# Patient Record
Sex: MALE | Race: WHITE | Employment: FULL TIME | URBAN - METROPOLITAN AREA
[De-identification: names, ages, dates, MRNs, and addresses within clinical notes are randomized per-mention and may not be internally consistent; named-entity substitution may affect disease eponyms.]

---

## 2019-04-01 ENCOUNTER — TELEPHONE (OUTPATIENT)
Dept: FAMILY MEDICINE | Facility: CLINIC | Age: 44
End: 2019-04-01

## 2019-04-01 ENCOUNTER — OFFICE VISIT (OUTPATIENT)
Dept: FAMILY MEDICINE | Facility: CLINIC | Age: 44
End: 2019-04-01
Payer: COMMERCIAL

## 2019-04-01 VITALS
OXYGEN SATURATION: 99 % | SYSTOLIC BLOOD PRESSURE: 124 MMHG | BODY MASS INDEX: 26.05 KG/M2 | HEART RATE: 86 BPM | WEIGHT: 192.3 LBS | DIASTOLIC BLOOD PRESSURE: 88 MMHG | RESPIRATION RATE: 16 BRPM | HEIGHT: 72 IN | TEMPERATURE: 97.4 F

## 2019-04-01 DIAGNOSIS — R07.89 ATYPICAL CHEST PAIN: Primary | ICD-10-CM

## 2019-04-01 DIAGNOSIS — Z72.0 TOBACCO ABUSE DISORDER: ICD-10-CM

## 2019-04-01 DIAGNOSIS — K21.00 GASTROESOPHAGEAL REFLUX DISEASE WITH ESOPHAGITIS: ICD-10-CM

## 2019-04-01 LAB
ALBUMIN SERPL-MCNC: 4.3 G/DL (ref 3.4–5)
ALP SERPL-CCNC: 70 U/L (ref 40–150)
ALT SERPL W P-5'-P-CCNC: 49 U/L (ref 0–70)
ANION GAP SERPL CALCULATED.3IONS-SCNC: 8 MMOL/L (ref 3–14)
AST SERPL W P-5'-P-CCNC: 16 U/L (ref 0–45)
BILIRUB SERPL-MCNC: 0.4 MG/DL (ref 0.2–1.3)
BUN SERPL-MCNC: 18 MG/DL (ref 7–30)
CALCIUM SERPL-MCNC: 9.1 MG/DL (ref 8.5–10.1)
CHLORIDE SERPL-SCNC: 105 MMOL/L (ref 94–109)
CHOLEST SERPL-MCNC: 257 MG/DL
CO2 SERPL-SCNC: 27 MMOL/L (ref 20–32)
CREAT SERPL-MCNC: 1.1 MG/DL (ref 0.66–1.25)
ERYTHROCYTE [DISTWIDTH] IN BLOOD BY AUTOMATED COUNT: 12.5 % (ref 10–15)
GFR SERPL CREATININE-BSD FRML MDRD: 81 ML/MIN/{1.73_M2}
GLUCOSE SERPL-MCNC: 90 MG/DL (ref 70–99)
HCT VFR BLD AUTO: 46.2 % (ref 40–53)
HDLC SERPL-MCNC: 44 MG/DL
HGB BLD-MCNC: 15.2 G/DL (ref 13.3–17.7)
LDLC SERPL CALC-MCNC: 179 MG/DL
MCH RBC QN AUTO: 30.4 PG (ref 26.5–33)
MCHC RBC AUTO-ENTMCNC: 32.9 G/DL (ref 31.5–36.5)
MCV RBC AUTO: 92 FL (ref 78–100)
NONHDLC SERPL-MCNC: 213 MG/DL
PLATELET # BLD AUTO: 264 10E9/L (ref 150–450)
POTASSIUM SERPL-SCNC: 4.4 MMOL/L (ref 3.4–5.3)
PROT SERPL-MCNC: 8.3 G/DL (ref 6.8–8.8)
RBC # BLD AUTO: 5 10E12/L (ref 4.4–5.9)
SODIUM SERPL-SCNC: 140 MMOL/L (ref 133–144)
TRIGL SERPL-MCNC: 169 MG/DL
TSH SERPL DL<=0.005 MIU/L-ACNC: 2.31 MU/L (ref 0.4–4)
WBC # BLD AUTO: 8.8 10E9/L (ref 4–11)

## 2019-04-01 PROCEDURE — 80053 COMPREHEN METABOLIC PANEL: CPT | Performed by: FAMILY MEDICINE

## 2019-04-01 PROCEDURE — 99214 OFFICE O/P EST MOD 30 MIN: CPT | Performed by: FAMILY MEDICINE

## 2019-04-01 PROCEDURE — 36415 COLL VENOUS BLD VENIPUNCTURE: CPT | Performed by: FAMILY MEDICINE

## 2019-04-01 PROCEDURE — 85027 COMPLETE CBC AUTOMATED: CPT | Performed by: FAMILY MEDICINE

## 2019-04-01 PROCEDURE — 80061 LIPID PANEL: CPT | Performed by: FAMILY MEDICINE

## 2019-04-01 PROCEDURE — 84443 ASSAY THYROID STIM HORMONE: CPT | Performed by: FAMILY MEDICINE

## 2019-04-01 ASSESSMENT — MIFFLIN-ST. JEOR: SCORE: 1794.78

## 2019-04-01 ASSESSMENT — PAIN SCALES - GENERAL: PAINLEVEL: MILD PAIN (2)

## 2019-04-01 NOTE — TELEPHONE ENCOUNTER
Patient has an appt today with Dr Rangel for establish care and Chest pain. Attempted triage call.     RN TRIAGE CALL:    Patient Contact    Attempt # 1    Was call answered?  No.  Left message on voicemail with information to call me back.

## 2019-04-01 NOTE — PROGRESS NOTES
SUBJECTIVE:   Richy Rogers is a 44 year old male who presents to clinic today for the following health issues:    CHEST PAIN     Onset: x 2-3 months    Description:   Location:  left side  Character: tight, sharp on occasion  Radiation: none  Duration: seconds and intermittent     Intensity: mild    Progression of Symptoms:  intermittent    Accompanying Signs & Symptoms:  Shortness of breath: no  Sweating: no  Nausea/vomiting: no  Lightheadedness: no  Palpitations: no  Fever/Chills: no  Cough: no  Heartburn: no    History:   Family history of heart disease YES  Tobacco use: YES    Precipitating factors:   Worse with exertion: no  Worse with deep breaths :  no  Related to food: no    Alleviating factors:  belching       Therapies Tried and outcome: zantac      Pain is been vague in nature and does not seem to be related to activity.  May actually come on more at rest.  Just a dull pain into the anterior left chest and perhaps into his shoulder and upper arm.  He is never short of breath with this but is short of breath with activity.  He has no cough.  There is no fever no chills.  He does have heartburn which is a different discomfort and takes occasional ranitidine.  Smoking is 3 or 4 cigarettes/day.  He did quit at one time.  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    Problem list and histories reviewed & adjusted, as indicated.  Additional history: as documented    BP Readings from Last 3 Encounters:   04/01/19 124/88    Wt Readings from Last 3 Encounters:   04/01/19 87.2 kg (192 lb 4.8 oz)                  Labs reviewed in EPIC    Reviewed and updated as needed this visit by clinical staff       Reviewed and updated as needed this visit by Provider         ROS:  Constitutional, HEENT, cardiovascular, pulmonary, gi and gu systems are negative, except as otherwise noted.    OBJECTIVE:     /88   Pulse 86   Temp 97.4  F (36.3  C) (Temporal)   Resp 16    "Ht 1.82 m (5' 11.65\")   Wt 87.2 kg (192 lb 4.8 oz)   SpO2 99%   BMI 26.33 kg/m    Body mass index is 26.33 kg/m .  GENERAL: healthy, alert and no distress  EYES: Eyes grossly normal to inspection, PERRL and conjunctivae and sclerae normal  HENT: Moist mucous membranes  NECK: no adenopathy, no asymmetry, masses, or scars and thyroid normal to palpation  RESP: lungs clear to auscultation - no rales, rhonchi or wheezes  CV: regular rate and rhythm, normal S1 S2, no S3 or S4, no murmur, click or rub, no peripheral edema and peripheral pulses strong  ABDOMEN: soft, nontender, no hepatosplenomegaly, no masses and bowel sounds normal  MS: no gross musculoskeletal defects noted, no edema  SKIN: no suspicious lesions or rashes  NEURO: Negative  Diagnostic Test Results:  Results for orders placed or performed in visit on 04/01/19 (from the past 24 hour(s))   Lipid Profile   Result Value Ref Range    Cholesterol 257 (H) <200 mg/dL    Triglycerides 169 (H) <150 mg/dL    HDL Cholesterol 44 >39 mg/dL    LDL Cholesterol Calculated 179 (H) <100 mg/dL    Non HDL Cholesterol 213 (H) <130 mg/dL   Comprehensive metabolic panel   Result Value Ref Range    Sodium 140 133 - 144 mmol/L    Potassium 4.4 3.4 - 5.3 mmol/L    Chloride 105 94 - 109 mmol/L    Carbon Dioxide 27 20 - 32 mmol/L    Anion Gap 8 3 - 14 mmol/L    Glucose 90 70 - 99 mg/dL    Urea Nitrogen 18 7 - 30 mg/dL    Creatinine 1.10 0.66 - 1.25 mg/dL    GFR Estimate 81 >60 mL/min/[1.73_m2]    GFR Estimate If Black >90 >60 mL/min/[1.73_m2]    Calcium 9.1 8.5 - 10.1 mg/dL    Bilirubin Total 0.4 0.2 - 1.3 mg/dL    Albumin 4.3 3.4 - 5.0 g/dL    Protein Total 8.3 6.8 - 8.8 g/dL    Alkaline Phosphatase 70 40 - 150 U/L    ALT 49 0 - 70 U/L    AST 16 0 - 45 U/L   TSH   Result Value Ref Range    TSH 2.31 0.40 - 4.00 mU/L   CBC with platelets   Result Value Ref Range    WBC 8.8 4.0 - 11.0 10e9/L    RBC Count 5.00 4.4 - 5.9 10e12/L    Hemoglobin 15.2 13.3 - 17.7 g/dL    Hematocrit 46.2 " "40.0 - 53.0 %    MCV 92 78 - 100 fl    MCH 30.4 26.5 - 33.0 pg    MCHC 32.9 31.5 - 36.5 g/dL    RDW 12.5 10.0 - 15.0 %    Platelet Count 264 150 - 450 10e9/L     PSYCH: mentation appears normal, affect normal/bright        ASSESSMENT/PLAN:         BP Screening:   Last 3 BP Readings:    BP Readings from Last 3 Encounters:   04/01/19 124/88       The following was recommended to the patient:  Re-screen BP within a year and recommended lifestyle modifications  Tobacco Cessation:   reports that he has been smoking cigarettes.  He has been smoking about 0.25 packs per day. He uses smokeless tobacco.  Tobacco Cessation Action Plan: Information offered: Patient not interested at this time    BMI:   Estimated body mass index is 26.33 kg/m  as calculated from the following:    Height as of this encounter: 1.82 m (5' 11.65\").    Weight as of this encounter: 87.2 kg (192 lb 4.8 oz).   Weight management plan: Not really discussed at this time      1. Atypical chest pain  Certainly given family history, cholesterol, smoking etc. his coronary artery risk relatively high.  Stress testing is planned below.  We discussed what things should send him to the emergency room or at any time if he has questions  - Lipid Profile  - Comprehensive metabolic panel  - TSH  - CBC with platelets  - NM Exercise stress test; Future    2. Tobacco abuse disorder  He actually does believe he could quit smoking but did not request any specific help.  He is aware of the risks    3. Gastroesophageal reflux disease with esophagitis  For now continue ranitidine.  Uncertain if this accounts for the chest pain with something such as a hiatal hernia.  During her workup at some point we may need to do more testing.      Patient Instructions   Have the stress tests and come back after to discuss.  If any time you think chest pain is worse or more symptoms, go to emergency      Richy Maldonado Rangel MD  [facility]  "

## 2019-04-01 NOTE — PATIENT INSTRUCTIONS
Have the stress tests and come back after to discuss.  If any time you think chest pain is worse or more symptoms, go to emergency

## 2019-04-02 NOTE — RESULT ENCOUNTER NOTE
Will have staff inform patient his tests/labs are normal or the values which are reported out of range are expected or in patient's usual range except Cholesterol and  triglyceride numbers. We will address this after his stress test. Treatment will be  the same for now

## 2019-04-04 ENCOUNTER — HOSPITAL ENCOUNTER (OUTPATIENT)
Dept: NUCLEAR MEDICINE | Facility: CLINIC | Age: 44
Setting detail: NUCLEAR MEDICINE
Discharge: HOME | End: 2019-04-04
Attending: FAMILY MEDICINE | Admitting: FAMILY MEDICINE
Payer: COMMERCIAL

## 2019-04-04 DIAGNOSIS — R07.89 ATYPICAL CHEST PAIN: ICD-10-CM

## 2019-04-04 PROCEDURE — A9502 TC99M TETROFOSMIN: HCPCS | Performed by: FAMILY MEDICINE

## 2019-04-04 PROCEDURE — 78452 HT MUSCLE IMAGE SPECT MULT: CPT

## 2019-04-04 PROCEDURE — 93017 CV STRESS TEST TRACING ONLY: CPT | Performed by: INTERNAL MEDICINE

## 2019-04-04 PROCEDURE — 78452 HT MUSCLE IMAGE SPECT MULT: CPT | Mod: 26 | Performed by: INTERNAL MEDICINE

## 2019-04-04 PROCEDURE — 34300033 ZZH RX 343: Performed by: FAMILY MEDICINE

## 2019-04-04 PROCEDURE — 93018 CV STRESS TEST I&R ONLY: CPT | Performed by: INTERNAL MEDICINE

## 2019-04-04 PROCEDURE — 93016 CV STRESS TEST SUPVJ ONLY: CPT | Performed by: INTERNAL MEDICINE

## 2019-04-04 RX ADMIN — TETROFOSMIN 30.7 MCI.: 1.38 INJECTION, POWDER, LYOPHILIZED, FOR SOLUTION INTRAVENOUS at 10:04

## 2019-04-04 RX ADMIN — TETROFOSMIN 10.3 MCI.: 1.38 INJECTION, POWDER, LYOPHILIZED, FOR SOLUTION INTRAVENOUS at 07:50

## 2019-04-05 ENCOUNTER — TELEPHONE (OUTPATIENT)
Dept: FAMILY MEDICINE | Facility: CLINIC | Age: 44
End: 2019-04-05

## 2019-04-05 DIAGNOSIS — R07.89 ATYPICAL CHEST PAIN: Primary | ICD-10-CM

## 2019-04-05 NOTE — TELEPHONE ENCOUNTER
----- Message from Richy Rangel MD sent at 4/5/2019 12:15 PM CDT -----  Team D, please help arrange a gastroscopy for his atypical chest pain.  Then also make sure he has a follow-up with me in April

## 2019-04-05 NOTE — TELEPHONE ENCOUNTER
Order placed for GI procedure referral for EGD. They will contact patient to schedule. Called patient to advise and states understanding. He scheduled follow up visit with Dr. Rangel on 4/15  Lynda Pascal CMA

## 2019-04-08 ENCOUNTER — TELEPHONE (OUTPATIENT)
Dept: FAMILY MEDICINE | Facility: CLINIC | Age: 44
End: 2019-04-08

## 2019-04-08 NOTE — TELEPHONE ENCOUNTER
Date of colonoscopy/EGD: 4/18  Surgeon: Dr. Saleh  Prep:egd  Packet:Colonoscopy/EGD instructions mailed to patient's home address.   Date: 4/8/2019      Surgery Scheduler

## 2019-04-12 ENCOUNTER — OFFICE VISIT (OUTPATIENT)
Dept: FAMILY MEDICINE | Facility: CLINIC | Age: 44
End: 2019-04-12
Payer: COMMERCIAL

## 2019-04-12 VITALS
TEMPERATURE: 96.7 F | WEIGHT: 193.8 LBS | HEART RATE: 86 BPM | BODY MASS INDEX: 26.54 KG/M2 | OXYGEN SATURATION: 97 % | DIASTOLIC BLOOD PRESSURE: 72 MMHG | SYSTOLIC BLOOD PRESSURE: 116 MMHG | RESPIRATION RATE: 16 BRPM

## 2019-04-12 DIAGNOSIS — Z72.0 TOBACCO ABUSE DISORDER: ICD-10-CM

## 2019-04-12 DIAGNOSIS — R07.89 ATYPICAL CHEST PAIN: Primary | ICD-10-CM

## 2019-04-12 PROCEDURE — 99214 OFFICE O/P EST MOD 30 MIN: CPT | Performed by: FAMILY MEDICINE

## 2019-04-12 ASSESSMENT — PAIN SCALES - GENERAL: PAINLEVEL: NO PAIN (0)

## 2019-04-12 NOTE — H&P (VIEW-ONLY)
SUBJECTIVE:   Richy Rogers is a 44 year old male who presents to clinic today for the following   health issues:      Chief Complaint   Patient presents with     RECHECK     chest pressure; still having       Patient is here today to discuss his stress test and chest pain.  There still is some upper left sided chest pain.  Remains that it can happen at any time not just with activity.  He is having trouble swallowing and feels at times as if food is sticking.  He did start his reflux medication.  He will have gastroscopy in the next week or so.  He otherwise denies shortness of breath.  Bowel habits are okay.  Week he is feeling reasonably well.  He continues to smoke.    Additional history: as documented    Reviewed  and updated as needed this visit by clinical staff  Tobacco  Allergies  Meds  Med Hx  Surg Hx  Fam Hx  Soc Hx        Reviewed and updated as needed this visit by Provider         BP Readings from Last 3 Encounters:   04/12/19 116/72   04/01/19 124/88    Wt Readings from Last 3 Encounters:   04/12/19 87.9 kg (193 lb 12.8 oz)   04/01/19 87.2 kg (192 lb 4.8 oz)                  Labs reviewed in EPIC    ROS:  Constitutional, HEENT, cardiovascular, pulmonary, gi and gu systems are negative, except as otherwise noted.    OBJECTIVE:     /72   Pulse 86   Temp 96.7  F (35.9  C) (Temporal)   Resp 16   Wt 87.9 kg (193 lb 12.8 oz)   SpO2 97%   BMI 26.54 kg/m    Body mass index is 26.54 kg/m .  GENERAL: healthy, alert and no distress  EYES: Eyes grossly normal to inspection, PERRL and conjunctivae and sclerae normal  NECK: no adenopathy, no asymmetry, masses, or scars and thyroid normal to palpation  RESP: lungs clear to auscultation - no rales, rhonchi or wheezes  CV: regular rate and rhythm, normal S1 S2, no S3 or S4, no murmur, click or rub, no peripheral edema and peripheral pulses strong  ABDOMEN: soft, nontender, no hepatosplenomegaly, no masses and bowel sounds normal  MS: no gross  "musculoskeletal defects noted, no edema    Diagnostic Test Results:  none     ASSESSMENT/PLAN:           Tobacco Cessation:   reports that he has been smoking cigarettes.  He has been smoking about 0.25 packs per day. He uses smokeless tobacco.  Tobacco Cessation Action Plan: Pharmacotherapies : Nicotine patch  Discussed other non-smoking behavior.  And forced that attempt to quit smoking her practice and not failure.  Also gave him some very specific tips to limit the amount of cigarettes to smoke.  He already has instituted some of these on his own.    BMI:   Estimated body mass index is 26.54 kg/m  as calculated from the following:    Height as of 4/1/19: 1.82 m (5' 11.65\").    Weight as of this encounter: 87.9 kg (193 lb 12.8 oz).   Weight management plan: Discussed healthy diet and exercise guidelines      1. Atypical chest pain  We discussed his nonstress test which does not show any ischemia.  He and his wife had questions about ejection fraction at 56%.  He is reassured this is considered standard and normal.  I suspect his chest pain is coming from reflux especially with his difficulty swallowing.  Gastroscopy will be done and we will proceed from there.    2. Tobacco abuse disorder  As above. There is commitment to make an effort.    We discussed Cholesterol and  triglyceride numbers and at this time,no statin added. Recheck annually. Work to be a nonsmoker and his risks reduce. As this is documented I am reviewing his family history and I may offer statin at next visit.       Patient Instructions   Heart looks ok  We need to check Cholesterol and  triglyceride numbers each year  Work/pdractice to be a non smoker  After gastroscopy we will let you know if Dr Saleh does not fully explain.      Time spent with greater than 50% spent in discussion, making the plan, or filling out paperwork with patient for illness/treatments was 25 minutes or more.  Richy Maldonado Rangel MD  [facility]      "

## 2019-04-12 NOTE — PATIENT INSTRUCTIONS
Heart looks ok  We need to check Cholesterol and  triglyceride numbers each year  Work/pdractice to be a non smoker  After gastroscopy we will let you know if Dr Saleh does not fully explain.

## 2019-04-12 NOTE — PROGRESS NOTES
SUBJECTIVE:   Richy Rogers is a 44 year old male who presents to clinic today for the following   health issues:      Chief Complaint   Patient presents with     RECHECK     chest pressure; still having       Patient is here today to discuss his stress test and chest pain.  There still is some upper left sided chest pain.  Remains that it can happen at any time not just with activity.  He is having trouble swallowing and feels at times as if food is sticking.  He did start his reflux medication.  He will have gastroscopy in the next week or so.  He otherwise denies shortness of breath.  Bowel habits are okay.  Week he is feeling reasonably well.  He continues to smoke.    Additional history: as documented    Reviewed  and updated as needed this visit by clinical staff  Tobacco  Allergies  Meds  Med Hx  Surg Hx  Fam Hx  Soc Hx        Reviewed and updated as needed this visit by Provider         BP Readings from Last 3 Encounters:   04/12/19 116/72   04/01/19 124/88    Wt Readings from Last 3 Encounters:   04/12/19 87.9 kg (193 lb 12.8 oz)   04/01/19 87.2 kg (192 lb 4.8 oz)                  Labs reviewed in EPIC    ROS:  Constitutional, HEENT, cardiovascular, pulmonary, gi and gu systems are negative, except as otherwise noted.    OBJECTIVE:     /72   Pulse 86   Temp 96.7  F (35.9  C) (Temporal)   Resp 16   Wt 87.9 kg (193 lb 12.8 oz)   SpO2 97%   BMI 26.54 kg/m    Body mass index is 26.54 kg/m .  GENERAL: healthy, alert and no distress  EYES: Eyes grossly normal to inspection, PERRL and conjunctivae and sclerae normal  NECK: no adenopathy, no asymmetry, masses, or scars and thyroid normal to palpation  RESP: lungs clear to auscultation - no rales, rhonchi or wheezes  CV: regular rate and rhythm, normal S1 S2, no S3 or S4, no murmur, click or rub, no peripheral edema and peripheral pulses strong  ABDOMEN: soft, nontender, no hepatosplenomegaly, no masses and bowel sounds normal  MS: no gross  "musculoskeletal defects noted, no edema    Diagnostic Test Results:  none     ASSESSMENT/PLAN:           Tobacco Cessation:   reports that he has been smoking cigarettes.  He has been smoking about 0.25 packs per day. He uses smokeless tobacco.  Tobacco Cessation Action Plan: Pharmacotherapies : Nicotine patch  Discussed other non-smoking behavior.  And forced that attempt to quit smoking her practice and not failure.  Also gave him some very specific tips to limit the amount of cigarettes to smoke.  He already has instituted some of these on his own.    BMI:   Estimated body mass index is 26.54 kg/m  as calculated from the following:    Height as of 4/1/19: 1.82 m (5' 11.65\").    Weight as of this encounter: 87.9 kg (193 lb 12.8 oz).   Weight management plan: Discussed healthy diet and exercise guidelines      1. Atypical chest pain  We discussed his nonstress test which does not show any ischemia.  He and his wife had questions about ejection fraction at 56%.  He is reassured this is considered standard and normal.  I suspect his chest pain is coming from reflux especially with his difficulty swallowing.  Gastroscopy will be done and we will proceed from there.    2. Tobacco abuse disorder  As above. There is commitment to make an effort.    We discussed Cholesterol and  triglyceride numbers and at this time,no statin added. Recheck annually. Work to be a nonsmoker and his risks reduce. As this is documented I am reviewing his family history and I may offer statin at next visit.       Patient Instructions   Heart looks ok  We need to check Cholesterol and  triglyceride numbers each year  Work/pdractice to be a non smoker  After gastroscopy we will let you know if Dr Saleh does not fully explain.      Time spent with greater than 50% spent in discussion, making the plan, or filling out paperwork with patient for illness/treatments was 25 minutes or more.  Richy Maldonado Rangel MD  [facility]      "

## 2019-04-17 ENCOUNTER — ANESTHESIA EVENT (OUTPATIENT)
Dept: GASTROENTEROLOGY | Facility: CLINIC | Age: 44
End: 2019-04-17
Payer: COMMERCIAL

## 2019-04-17 ASSESSMENT — LIFESTYLE VARIABLES: TOBACCO_USE: 1

## 2019-04-18 ENCOUNTER — HOSPITAL ENCOUNTER (OUTPATIENT)
Facility: CLINIC | Age: 44
Discharge: HOME | End: 2019-04-18
Attending: SURGERY | Admitting: SURGERY
Payer: COMMERCIAL

## 2019-04-18 ENCOUNTER — ANESTHESIA (OUTPATIENT)
Dept: GASTROENTEROLOGY | Facility: CLINIC | Age: 44
End: 2019-04-18
Payer: COMMERCIAL

## 2019-04-18 VITALS
TEMPERATURE: 97.7 F | BODY MASS INDEX: 25.9 KG/M2 | SYSTOLIC BLOOD PRESSURE: 125 MMHG | RESPIRATION RATE: 16 BRPM | OXYGEN SATURATION: 98 % | HEIGHT: 71 IN | HEART RATE: 88 BPM | DIASTOLIC BLOOD PRESSURE: 85 MMHG | WEIGHT: 185 LBS

## 2019-04-18 LAB — UPPER GI ENDOSCOPY: NORMAL

## 2019-04-18 PROCEDURE — 43239 EGD BIOPSY SINGLE/MULTIPLE: CPT | Performed by: SURGERY

## 2019-04-18 PROCEDURE — 25000125 ZZHC RX 250: Performed by: NURSE ANESTHETIST, CERTIFIED REGISTERED

## 2019-04-18 PROCEDURE — 88305 TISSUE EXAM BY PATHOLOGIST: CPT | Performed by: SURGERY

## 2019-04-18 PROCEDURE — 25000128 H RX IP 250 OP 636: Performed by: NURSE ANESTHETIST, CERTIFIED REGISTERED

## 2019-04-18 PROCEDURE — 37000008 ZZH ANESTHESIA TECHNICAL FEE, 1ST 30 MIN: Performed by: SURGERY

## 2019-04-18 PROCEDURE — 25800030 ZZH RX IP 258 OP 636: Performed by: NURSE ANESTHETIST, CERTIFIED REGISTERED

## 2019-04-18 PROCEDURE — 25000125 ZZHC RX 250: Performed by: SURGERY

## 2019-04-18 RX ORDER — NALOXONE HYDROCHLORIDE 0.4 MG/ML
.1-.4 INJECTION, SOLUTION INTRAMUSCULAR; INTRAVENOUS; SUBCUTANEOUS
Status: DISCONTINUED | OUTPATIENT
Start: 2019-04-18 | End: 2019-04-18 | Stop reason: HOSPADM

## 2019-04-18 RX ORDER — SODIUM CHLORIDE, SODIUM LACTATE, POTASSIUM CHLORIDE, CALCIUM CHLORIDE 600; 310; 30; 20 MG/100ML; MG/100ML; MG/100ML; MG/100ML
INJECTION, SOLUTION INTRAVENOUS CONTINUOUS
Status: DISCONTINUED | OUTPATIENT
Start: 2019-04-18 | End: 2019-04-18 | Stop reason: HOSPADM

## 2019-04-18 RX ORDER — ONDANSETRON 4 MG/1
4 TABLET, ORALLY DISINTEGRATING ORAL EVERY 30 MIN PRN
Status: DISCONTINUED | OUTPATIENT
Start: 2019-04-18 | End: 2019-04-18 | Stop reason: HOSPADM

## 2019-04-18 RX ORDER — ONDANSETRON 2 MG/ML
4 INJECTION INTRAMUSCULAR; INTRAVENOUS EVERY 30 MIN PRN
Status: DISCONTINUED | OUTPATIENT
Start: 2019-04-18 | End: 2019-04-18 | Stop reason: HOSPADM

## 2019-04-18 RX ORDER — LIDOCAINE 40 MG/G
CREAM TOPICAL
Status: DISCONTINUED | OUTPATIENT
Start: 2019-04-18 | End: 2019-04-18 | Stop reason: HOSPADM

## 2019-04-18 RX ORDER — PROPOFOL 10 MG/ML
INJECTION, EMULSION INTRAVENOUS PRN
Status: DISCONTINUED | OUTPATIENT
Start: 2019-04-18 | End: 2019-04-18

## 2019-04-18 RX ORDER — PROPOFOL 10 MG/ML
INJECTION, EMULSION INTRAVENOUS CONTINUOUS PRN
Status: DISCONTINUED | OUTPATIENT
Start: 2019-04-18 | End: 2019-04-18

## 2019-04-18 RX ORDER — MEPERIDINE HYDROCHLORIDE 25 MG/ML
12.5 INJECTION INTRAMUSCULAR; INTRAVENOUS; SUBCUTANEOUS
Status: DISCONTINUED | OUTPATIENT
Start: 2019-04-18 | End: 2019-04-18 | Stop reason: HOSPADM

## 2019-04-18 RX ADMIN — SODIUM CHLORIDE, POTASSIUM CHLORIDE, SODIUM LACTATE AND CALCIUM CHLORIDE: 600; 310; 30; 20 INJECTION, SOLUTION INTRAVENOUS at 09:43

## 2019-04-18 RX ADMIN — LIDOCAINE HYDROCHLORIDE 0.5 ML: 10 INJECTION, SOLUTION EPIDURAL; INFILTRATION; INTRACAUDAL; PERINEURAL at 09:42

## 2019-04-18 RX ADMIN — PROPOFOL 50 MG: 10 INJECTION, EMULSION INTRAVENOUS at 10:29

## 2019-04-18 RX ADMIN — PROPOFOL 150 MCG/KG/MIN: 10 INJECTION, EMULSION INTRAVENOUS at 10:29

## 2019-04-18 ASSESSMENT — MIFFLIN-ST. JEOR: SCORE: 1751.28

## 2019-04-18 NOTE — DISCHARGE INSTRUCTIONS
[facility]    Home Care Following Endoscopy          Activity:    You have just undergone an endoscopic procedure usually performed with conscious sedation.  Do not work or operate machinery (including a car) for at least 12 hours.      I encourage you to walk and attempt to pass this air as soon as possible.    Diet:    Return to the diet you were on before your procedure but eat lightly for the first 12-24 hours.    Drink plenty of water.    Resume any regular medications unless otherwise advised by your physician.  Please begin any new medication prescribed as a result of your procedure as directed by your physician.     If you had any biopsy or polyp removed please refrain from aspirin or aspirin products for 2 days.  If on Coumadin please restart as instructed by your physician.   Pain:    You may take Tylenol as needed for pain.  Expected Recovery:    It is also normal to have a mild sore throat after upper endoscopy.    Call Your Physician if You Have:    After Upper Endoscopy:  o Shoulder, back or chest pain.  o Difficulty breathing or swallowing.  o Vomiting blood.    Any questions or concerns about your recovery, please call [phone] or after hours [phone] Nurse Advice Line.    Follow-up Care:    You should receive a call or letter with your results within 1 week. Please call if you have not received a notification of your results.  If asked to return to clinic please make an appointment 1 week after your procedure.  Call [phone].

## 2019-04-18 NOTE — ANESTHESIA PREPROCEDURE EVALUATION
Anesthesia Pre-Procedure Evaluation    Patient: Scarlet Rogers   MRN: [id number] : 1975          Preoperative Diagnosis: Atypical chest pain    Procedure(s):  ESOPHAGOSCOPY, GASTROSCOPY, DUODENOSCOPY (EGD)    History reviewed. No pertinent past medical history.  History reviewed. No pertinent surgical history.    Anesthesia Evaluation     . Pt has not had prior anesthetic            ROS/MED HX    ENT/Pulmonary:     (+)tobacco use, Current use , . .    Neurologic:  - neg neurologic ROS     Cardiovascular:  - neg cardiovascular ROS   (+) ----. : . . . :. . Previous cardiac testing date:results:Stress Testdate:19 results:GATED MYOCARDIAL PERFUSION SCINTIGRAPHY EXERCISE- ONE DAY STUDY      2019 11:29 AM SCARLET ROGERS 44 years Male 1975.     Indication/Clinical History: Atypical chest pain     Impression  1. Myocardial perfusion imaging using single isotope technique  demonstrated probably normal perfusion.   2. Gated images demonstrated normal LV cavity size and systolic  function. The left ventricular systolic function is 56%.  3. Compared to the prior study from no prior study .     Procedure  The patient performed treadmill exercise using a Brent protocol,  completing 11 minutes and 4 seconds with an estimated workload of 13.4  METS.  The test was terminated due to fatigue. The heart rate was 72  beats per minute at baseline and increased to 160 beats at peak  exercise, which was 90% of the maximum predicted heart rate. The rest  blood pressure was 116/84 mm/Hg and peak blood pressure is 170/78mm/Hg  with rate pressure product of 27, 200. The patient experienced no  symptoms during the test. The patient was not on a beta blocker.     Myocardial perfusion imaging was performed at rest, approximately 45  minutes after the injection intravenously of 10.3of Tc-99m Myoview. At  peak exercise, the patient was injected intravenously with 30.7 mCi of   Tc-99m Myoview and exercise continued for  approximately 1 minute.  Gated post-stress tomographic imaging was performed approximately 30  minutes after stress     EKG Findings  The resting EKG demonstrated normal sinus rhythm . The stress EKG  demonstrated no ischemic changes.     Tomographic Findings  Overall, the study quality is adequate . On the stress images, mild  count reduction is seen in the basal inferior inferior septal wall and  basal inferolateral wall. On the rest images, similar pattern of mild  count reduction is again seen in the basal inferior inferior septal  and basal inferolateral walls. Results suggest diaphragm and visceral  attenuation artifact without evidence for ischemia . Gated images  demonstrated normal LV cavity size with end-diastolic volumes measured  at 84 and 81 mL and rest and stress respectively. The LV systolic  function appears normal. The left ventricular ejection fraction was  calculated to be 56%. TID was absent.     GRACE COLE MD date: results: date: results:          METS/Exercise Tolerance:     Hematologic:  - neg hematologic  ROS       Musculoskeletal:  - neg musculoskeletal ROS       GI/Hepatic:     (+) GERD Symptomatic,       Renal/Genitourinary:  - ROS Renal section negative       Endo:  - neg endo ROS       Psychiatric:  - neg psychiatric ROS       Infectious Disease:  - neg infectious disease ROS       Malignancy:      - no malignancy   Other:    - neg other ROS                      Physical Exam  Normal systems: cardiovascular, pulmonary and dental    Airway   Mallampati: II  TM distance: <3 FB  Neck ROM: full    Dental     Cardiovascular   Rhythm and rate: regular and normal      Pulmonary    breath sounds clear to auscultation            Lab Results   Component Value Date    WBC 8.8 04/01/2019    HGB 15.2 04/01/2019    HCT 46.2 04/01/2019     04/01/2019     04/01/2019    POTASSIUM 4.4 04/01/2019    CHLORIDE 105 04/01/2019    CO2 27 04/01/2019    BUN 18 04/01/2019    CR 1.10 04/01/2019  "   GLC 90 04/01/2019    RADHA 9.1 04/01/2019    ALBUMIN 4.3 04/01/2019    PROTTOTAL 8.3 04/01/2019    ALT 49 04/01/2019    AST 16 04/01/2019    ALKPHOS 70 04/01/2019    BILITOTAL 0.4 04/01/2019    TSH 2.31 04/01/2019       Preop Vitals  BP Readings from Last 3 Encounters:   04/12/19 116/72   04/01/19 124/88    Pulse Readings from Last 3 Encounters:   04/12/19 86   04/01/19 86      Resp Readings from Last 3 Encounters:   04/12/19 16   04/01/19 16    SpO2 Readings from Last 3 Encounters:   04/12/19 97%   04/01/19 99%      Temp Readings from Last 1 Encounters:   04/12/19 96.7  F (35.9  C) (Temporal)    Ht Readings from Last 1 Encounters:   04/01/19 1.82 m (5' 11.65\")      Wt Readings from Last 1 Encounters:   04/12/19 87.9 kg (193 lb 12.8 oz)    Estimated body mass index is 26.54 kg/m  as calculated from the following:    Height as of 4/1/19: 1.82 m (5' 11.65\").    Weight as of 4/12/19: 87.9 kg (193 lb 12.8 oz).       Anesthesia Plan      History & Physical Review  History and physical reviewed and following examination; no interval change.    ASA Status:  2 .    NPO Status:  > 8 hours    Plan for MAC with Propofol induction. Maintenance will be TIVA.  Reason for MAC:  Deep or markedly invasive procedure (G8)         Postoperative Care      Consents  Anesthetic plan, risks, benefits and alternatives discussed with:  Patient.  Use of blood products discussed: No .   .                 KALYN Webster CRNA  "

## 2019-04-18 NOTE — ANESTHESIA CARE TRANSFER NOTE
Patient: Richy Rogers    Procedure(s):  ESOPHAGOSCOPY, GASTROSCOPY, DUODENOSCOPY (EGD)    Diagnosis: Atypical chest pain  Diagnosis Additional Information: No value filed.    Anesthesia Type:   MAC     Note:  Airway :Room Air  Patient transferred to:Phase II  Handoff Report: Identifed the Patient, Identified the Reponsible Provider, Reviewed the pertinent medical history, Discussed the surgical course, Reviewed Intra-OP anesthesia mangement and issues during anesthesia, Set expectations for post-procedure period and Allowed opportunity for questions and acknowledgement of understanding      Vitals: (Last set prior to Anesthesia Care Transfer)    CRNA VITALS  4/18/2019 1016 - 4/18/2019 1116      4/18/2019             EKG:  NSR                Electronically Signed By: KALYN Webster CRNA  April 18, 2019  3:39 PM

## 2019-04-18 NOTE — ANESTHESIA POSTPROCEDURE EVALUATION
Patient: Richy Pintoalinepriti    Procedure(s):  ESOPHAGOSCOPY, GASTROSCOPY, DUODENOSCOPY (EGD)    Diagnosis:Atypical chest pain  Diagnosis Additional Information: No value filed.    Anesthesia Type:  MAC    Note:  Anesthesia Post Evaluation    Patient location during evaluation: Phase 2 and Bedside  Patient participation: Able to fully participate in evaluation  Level of consciousness: awake and alert  Pain management: adequate  Airway patency: patent  Cardiovascular status: acceptable  Respiratory status: acceptable  Hydration status: acceptable  PONV: none     Anesthetic complications: None    Comments: Patient was pleased with his care today. There were no anesthesia complications noted. Will follow as needed.        Last vitals:  Vitals:    04/18/19 1055 04/18/19 1100 04/18/19 1115   BP: 127/90 113/89 125/85   Pulse: 97 99 88   Resp:  16 16   Temp:      SpO2: 99% 98%          Electronically Signed By: KALYN Webster CRNA  April 18, 2019  3:40 PM

## 2019-04-18 NOTE — INTERVAL H&P NOTE
The History and Physical has been reviewed, the patient has been examined and no changes have occurred in the patient's condition since the H & P was completed.       [facility], DO

## 2019-04-18 NOTE — LETTER
[address] [address]           Tel [phone] Richy Ken  [address] [place] [address]  April 25, 2019    Dear Richy,   This letter is to inform you of the results of your pathology report on your upper endoscopy (EGD).    Your pathology report was:  SPECIMEN(S):   Antral biopsy     FINAL DIAGNOSIS:   Antral biopsy:   - No diagnostic abnormalities identified , benign gastric antral mucosa   without atypia or inflammation.   - Negative for Helicobacter pylori on H&E stained sections.     Electronically signed out by:     AUDREY Brambila M.D.   Normal, please follow up by having a repeat upper endoscopy (EGD) or see a gastroenterologist, if your symptoms worsen.  If you have questions, please contact your primary care physician.    If you have any questions or concerns please do not hesitate to call my office at [phone].  Sincerely,     [facility] Saleh, DO  [facility]

## 2019-04-23 LAB — COPATH REPORT: NORMAL

## 2019-12-12 ENCOUNTER — HOSPITAL ENCOUNTER (INPATIENT)
Facility: CLINIC | Age: 44
LOS: 2 days | Discharge: HOME | DRG: 247 | End: 2019-12-14
Admitting: INTERNAL MEDICINE
Payer: COMMERCIAL

## 2019-12-12 ENCOUNTER — HOSPITAL ENCOUNTER (EMERGENCY)
Facility: CLINIC | Age: 44
Discharge: ANOTHER HOSPITAL | End: 2019-12-12
Attending: EMERGENCY MEDICINE | Admitting: EMERGENCY MEDICINE
Payer: COMMERCIAL

## 2019-12-12 VITALS
WEIGHT: 185 LBS | OXYGEN SATURATION: 99 % | BODY MASS INDEX: 25.9 KG/M2 | RESPIRATION RATE: 21 BRPM | TEMPERATURE: 98.2 F | HEART RATE: 75 BPM | SYSTOLIC BLOOD PRESSURE: 144 MMHG | DIASTOLIC BLOOD PRESSURE: 111 MMHG | HEIGHT: 71 IN

## 2019-12-12 DIAGNOSIS — I21.11 ST ELEVATION MYOCARDIAL INFARCTION INVOLVING RIGHT CORONARY ARTERY (H): Primary | ICD-10-CM

## 2019-12-12 DIAGNOSIS — I21.02 ACUTE ST ELEVATION MYOCARDIAL INFARCTION (STEMI) INVOLVING LEFT ANTERIOR DESCENDING (LAD) CORONARY ARTERY (H): ICD-10-CM

## 2019-12-12 DIAGNOSIS — I25.10 CORONARY ARTERY DISEASE INVOLVING NATIVE CORONARY ARTERY OF NATIVE HEART WITHOUT ANGINA PECTORIS: ICD-10-CM

## 2019-12-12 DIAGNOSIS — I21.11 ST ELEVATION MYOCARDIAL INFARCTION INVOLVING RIGHT CORONARY ARTERY (H): ICD-10-CM

## 2019-12-12 PROBLEM — I24.9 ACS (ACUTE CORONARY SYNDROME) (H): Status: ACTIVE | Noted: 2019-12-12

## 2019-12-12 LAB
ANION GAP SERPL CALCULATED.3IONS-SCNC: 1 MMOL/L (ref 3–14)
APTT PPP: 27 SEC (ref 22–37)
BASOPHILS # BLD AUTO: 0.1 10E9/L (ref 0–0.2)
BASOPHILS NFR BLD AUTO: 0.4 %
BUN SERPL-MCNC: 23 MG/DL (ref 7–30)
CALCIUM SERPL-MCNC: 9.2 MG/DL (ref 8.5–10.1)
CATH EF QUANTITATIVE: 40 %
CHLORIDE SERPL-SCNC: 108 MMOL/L (ref 94–109)
CHOLEST SERPL-MCNC: 212 MG/DL
CO2 SERPL-SCNC: 30 MMOL/L (ref 20–32)
CREAT SERPL-MCNC: 1.11 MG/DL (ref 0.66–1.25)
DIFFERENTIAL METHOD BLD: ABNORMAL
EOSINOPHIL NFR BLD AUTO: 0.8 %
ERYTHROCYTE [DISTWIDTH] IN BLOOD BY AUTOMATED COUNT: 12.2 % (ref 10–15)
GFR SERPL CREATININE-BSD FRML MDRD: 80 ML/MIN/{1.73_M2}
GLUCOSE SERPL-MCNC: 110 MG/DL (ref 70–99)
HBA1C MFR BLD: 5.9 % (ref 0–5.6)
HCT VFR BLD AUTO: 47.8 % (ref 40–53)
HDLC SERPL-MCNC: 37 MG/DL
HGB BLD-MCNC: 15.8 G/DL (ref 13.3–17.7)
IMM GRANULOCYTES # BLD: 0.1 10E9/L (ref 0–0.4)
IMM GRANULOCYTES NFR BLD: 0.6 %
INR PPP: 1.08 (ref 0.86–1.14)
KCT BLD-ACNC: 187 SEC (ref 75–150)
KCT BLD-ACNC: 219 SEC (ref 75–150)
KCT BLD-ACNC: 328 SEC (ref 75–150)
LACTATE BLD-SCNC: 1.1 MMOL/L (ref 0.7–2)
LDLC SERPL CALC-MCNC: 143 MG/DL
LYMPHOCYTES # BLD AUTO: 1.9 10E9/L (ref 0.8–5.3)
LYMPHOCYTES NFR BLD AUTO: 14 %
MCH RBC QN AUTO: 30.6 PG (ref 26.5–33)
MCHC RBC AUTO-ENTMCNC: 33.1 G/DL (ref 31.5–36.5)
MCV RBC AUTO: 93 FL (ref 78–100)
MONOCYTES # BLD AUTO: 1.2 10E9/L (ref 0–1.3)
MONOCYTES NFR BLD AUTO: 8.9 %
NEUTROPHILS # BLD AUTO: 10.4 10E9/L (ref 1.6–8.3)
NEUTROPHILS NFR BLD AUTO: 75.3 %
NONHDLC SERPL-MCNC: 175 MG/DL
NRBC # BLD AUTO: 0 10*3/UL
NRBC BLD AUTO-RTO: 0 /100
PLATELET # BLD AUTO: 281 10E9/L (ref 150–450)
POTASSIUM SERPL-SCNC: 4.3 MMOL/L (ref 3.4–5.3)
RBC # BLD AUTO: 5.16 10E12/L (ref 4.4–5.9)
SODIUM SERPL-SCNC: 139 MMOL/L (ref 133–144)
TRIGL SERPL-MCNC: 159 MG/DL
TROPONIN I SERPL-MCNC: 0.34 UG/L (ref 0–0.04)
TROPONIN I SERPL-MCNC: 12.4 UG/L (ref 0–0.04)
TROPONIN I SERPL-MCNC: 15 UG/L (ref 0–0.04)
TROPONIN I SERPL-MCNC: 4.74 UG/L (ref 0–0.04)
WBC # BLD AUTO: 13.8 10E9/L (ref 4–11)

## 2019-12-12 PROCEDURE — 93010 ELECTROCARDIOGRAM REPORT: CPT | Mod: Z6 | Performed by: EMERGENCY MEDICINE

## 2019-12-12 PROCEDURE — 99152 MOD SED SAME PHYS/QHP 5/>YRS: CPT | Performed by: INTERNAL MEDICINE

## 2019-12-12 PROCEDURE — 83605 ASSAY OF LACTIC ACID: CPT

## 2019-12-12 PROCEDURE — 25800030 ZZH RX IP 258 OP 636: Performed by: INTERNAL MEDICINE

## 2019-12-12 PROCEDURE — 99223 1ST HOSP IP/OBS HIGH 75: CPT | Mod: AI | Performed by: INTERNAL MEDICINE

## 2019-12-12 PROCEDURE — 25000128 H RX IP 250 OP 636: Performed by: INTERNAL MEDICINE

## 2019-12-12 PROCEDURE — 99285 EMERGENCY DEPT VISIT HI MDM: CPT | Mod: 25 | Performed by: EMERGENCY MEDICINE

## 2019-12-12 PROCEDURE — 93005 ELECTROCARDIOGRAM TRACING: CPT

## 2019-12-12 PROCEDURE — 85025 COMPLETE CBC W/AUTO DIFF WBC: CPT | Performed by: EMERGENCY MEDICINE

## 2019-12-12 PROCEDURE — 99291 CRITICAL CARE FIRST HOUR: CPT | Mod: 25 | Performed by: EMERGENCY MEDICINE

## 2019-12-12 PROCEDURE — 4A023N7 MEASUREMENT OF CARDIAC SAMPLING AND PRESSURE, LEFT HEART, PERCUTANEOUS APPROACH: ICD-10-PCS | Performed by: INTERNAL MEDICINE

## 2019-12-12 PROCEDURE — 85347 COAGULATION TIME ACTIVATED: CPT

## 2019-12-12 PROCEDURE — 83036 HEMOGLOBIN GLYCOSYLATED A1C: CPT | Performed by: INTERNAL MEDICINE

## 2019-12-12 PROCEDURE — 25000132 ZZH RX MED GY IP 250 OP 250 PS 637: Performed by: INTERNAL MEDICINE

## 2019-12-12 PROCEDURE — 93010 ELECTROCARDIOGRAM REPORT: CPT | Performed by: INTERNAL MEDICINE

## 2019-12-12 PROCEDURE — 96374 THER/PROPH/DIAG INJ IV PUSH: CPT | Performed by: EMERGENCY MEDICINE

## 2019-12-12 PROCEDURE — 25000125 ZZHC RX 250: Performed by: INTERNAL MEDICINE

## 2019-12-12 PROCEDURE — 027035Z DILATION OF CORONARY ARTERY, ONE ARTERY WITH TWO DRUG-ELUTING INTRALUMINAL DEVICES, PERCUTANEOUS APPROACH: ICD-10-PCS | Performed by: INTERNAL MEDICINE

## 2019-12-12 PROCEDURE — 85730 THROMBOPLASTIN TIME PARTIAL: CPT | Performed by: EMERGENCY MEDICINE

## 2019-12-12 PROCEDURE — 84484 ASSAY OF TROPONIN QUANT: CPT | Performed by: INTERNAL MEDICINE

## 2019-12-12 PROCEDURE — C1725 CATH, TRANSLUMIN NON-LASER: HCPCS | Performed by: INTERNAL MEDICINE

## 2019-12-12 PROCEDURE — 27210794 ZZH OR GENERAL SUPPLY STERILE: Performed by: INTERNAL MEDICINE

## 2019-12-12 PROCEDURE — C1894 INTRO/SHEATH, NON-LASER: HCPCS | Performed by: INTERNAL MEDICINE

## 2019-12-12 PROCEDURE — 25000128 H RX IP 250 OP 636: Performed by: EMERGENCY MEDICINE

## 2019-12-12 PROCEDURE — 93458 L HRT ARTERY/VENTRICLE ANGIO: CPT | Performed by: INTERNAL MEDICINE

## 2019-12-12 PROCEDURE — 36415 COLL VENOUS BLD VENIPUNCTURE: CPT | Performed by: INTERNAL MEDICINE

## 2019-12-12 PROCEDURE — 80048 BASIC METABOLIC PNL TOTAL CA: CPT | Performed by: EMERGENCY MEDICINE

## 2019-12-12 PROCEDURE — B2151ZZ FLUOROSCOPY OF LEFT HEART USING LOW OSMOLAR CONTRAST: ICD-10-PCS | Performed by: INTERNAL MEDICINE

## 2019-12-12 PROCEDURE — 21000001 ZZH R&B HEART CARE

## 2019-12-12 PROCEDURE — 93005 ELECTROCARDIOGRAM TRACING: CPT | Performed by: EMERGENCY MEDICINE

## 2019-12-12 PROCEDURE — 85610 PROTHROMBIN TIME: CPT | Performed by: EMERGENCY MEDICINE

## 2019-12-12 PROCEDURE — C1887 CATHETER, GUIDING: HCPCS | Performed by: INTERNAL MEDICINE

## 2019-12-12 PROCEDURE — B2111ZZ FLUOROSCOPY OF MULTIPLE CORONARY ARTERIES USING LOW OSMOLAR CONTRAST: ICD-10-PCS | Performed by: INTERNAL MEDICINE

## 2019-12-12 PROCEDURE — C9606 PERC D-E COR REVASC W AMI S: HCPCS | Performed by: INTERNAL MEDICINE

## 2019-12-12 PROCEDURE — C1769 GUIDE WIRE: HCPCS | Performed by: INTERNAL MEDICINE

## 2019-12-12 PROCEDURE — 99153 MOD SED SAME PHYS/QHP EA: CPT | Performed by: INTERNAL MEDICINE

## 2019-12-12 PROCEDURE — C1874 STENT, COATED/COV W/DEL SYS: HCPCS | Performed by: INTERNAL MEDICINE

## 2019-12-12 PROCEDURE — 84484 ASSAY OF TROPONIN QUANT: CPT | Performed by: EMERGENCY MEDICINE

## 2019-12-12 PROCEDURE — 80061 LIPID PANEL: CPT | Performed by: INTERNAL MEDICINE

## 2019-12-12 PROCEDURE — 99291 CRITICAL CARE FIRST HOUR: CPT | Mod: 25 | Performed by: INTERNAL MEDICINE

## 2019-12-12 PROCEDURE — 25000132 ZZH RX MED GY IP 250 OP 250 PS 637: Performed by: EMERGENCY MEDICINE

## 2019-12-12 DEVICE — STENT RESOLUTE ONYX DE 2.7FR 3.50X38MM RONYX DE35038UX: Type: IMPLANTABLE DEVICE | Status: FUNCTIONAL

## 2019-12-12 DEVICE — STENT RESOLUTE ONYX DE 2.7FR 3.00X38MM RONYX DE30038UX: Type: IMPLANTABLE DEVICE | Status: FUNCTIONAL

## 2019-12-12 RX ORDER — ACETAMINOPHEN 650 MG/1
650 SUPPOSITORY RECTAL EVERY 4 HOURS PRN
Status: DISCONTINUED | OUTPATIENT
Start: 2019-12-12 | End: 2019-12-14 | Stop reason: HOSPADM

## 2019-12-12 RX ORDER — LISINOPRIL 2.5 MG/1
2.5 TABLET ORAL DAILY
Status: DISCONTINUED | OUTPATIENT
Start: 2019-12-12 | End: 2019-12-14 | Stop reason: HOSPADM

## 2019-12-12 RX ORDER — IOPAMIDOL 755 MG/ML
INJECTION, SOLUTION INTRAVASCULAR
Status: DISCONTINUED | OUTPATIENT
Start: 2019-12-12 | End: 2019-12-12 | Stop reason: HOSPADM

## 2019-12-12 RX ORDER — NITROGLYCERIN 0.4 MG/1
0.4 TABLET SUBLINGUAL EVERY 5 MIN PRN
Status: DISCONTINUED | OUTPATIENT
Start: 2019-12-12 | End: 2019-12-14 | Stop reason: HOSPADM

## 2019-12-12 RX ORDER — ONDANSETRON 4 MG/1
4 TABLET, ORALLY DISINTEGRATING ORAL EVERY 6 HOURS PRN
Status: DISCONTINUED | OUTPATIENT
Start: 2019-12-12 | End: 2019-12-14 | Stop reason: HOSPADM

## 2019-12-12 RX ORDER — ACETAMINOPHEN 325 MG/1
650 TABLET ORAL EVERY 4 HOURS PRN
Status: DISCONTINUED | OUTPATIENT
Start: 2019-12-12 | End: 2019-12-14 | Stop reason: HOSPADM

## 2019-12-12 RX ORDER — NALOXONE HYDROCHLORIDE 0.4 MG/ML
.2-.4 INJECTION, SOLUTION INTRAMUSCULAR; INTRAVENOUS; SUBCUTANEOUS
Status: DISCONTINUED | OUTPATIENT
Start: 2019-12-12 | End: 2019-12-12

## 2019-12-12 RX ORDER — ONDANSETRON 2 MG/ML
4 INJECTION INTRAMUSCULAR; INTRAVENOUS EVERY 6 HOURS PRN
Status: DISCONTINUED | OUTPATIENT
Start: 2019-12-12 | End: 2019-12-14 | Stop reason: HOSPADM

## 2019-12-12 RX ORDER — LIDOCAINE 40 MG/G
CREAM TOPICAL
Status: DISCONTINUED | OUTPATIENT
Start: 2019-12-12 | End: 2019-12-14 | Stop reason: HOSPADM

## 2019-12-12 RX ORDER — ASPIRIN 81 MG/1
324 TABLET, CHEWABLE ORAL ONCE
Status: COMPLETED | OUTPATIENT
Start: 2019-12-12 | End: 2019-12-12

## 2019-12-12 RX ORDER — FLUMAZENIL 0.1 MG/ML
0.2 INJECTION, SOLUTION INTRAVENOUS
Status: ACTIVE | OUTPATIENT
Start: 2019-12-12 | End: 2019-12-13

## 2019-12-12 RX ORDER — ACETAMINOPHEN 325 MG/1
650 TABLET ORAL EVERY 4 HOURS PRN
Status: DISCONTINUED | OUTPATIENT
Start: 2019-12-12 | End: 2019-12-12

## 2019-12-12 RX ORDER — NITROGLYCERIN 5 MG/ML
VIAL (ML) INTRAVENOUS
Status: DISCONTINUED | OUTPATIENT
Start: 2019-12-12 | End: 2019-12-12 | Stop reason: HOSPADM

## 2019-12-12 RX ORDER — VERAPAMIL HYDROCHLORIDE 2.5 MG/ML
INJECTION, SOLUTION INTRAVENOUS
Status: DISCONTINUED | OUTPATIENT
Start: 2019-12-12 | End: 2019-12-12 | Stop reason: HOSPADM

## 2019-12-12 RX ORDER — SODIUM CHLORIDE 9 MG/ML
INJECTION, SOLUTION INTRAVENOUS CONTINUOUS
Status: ACTIVE | OUTPATIENT
Start: 2019-12-12 | End: 2019-12-12

## 2019-12-12 RX ORDER — NALOXONE HYDROCHLORIDE 0.4 MG/ML
.1-.4 INJECTION, SOLUTION INTRAMUSCULAR; INTRAVENOUS; SUBCUTANEOUS
Status: DISCONTINUED | OUTPATIENT
Start: 2019-12-12 | End: 2019-12-14 | Stop reason: HOSPADM

## 2019-12-12 RX ORDER — METOPROLOL TARTRATE 25 MG/1
25 TABLET, FILM COATED ORAL 2 TIMES DAILY
Status: DISCONTINUED | OUTPATIENT
Start: 2019-12-12 | End: 2019-12-14 | Stop reason: HOSPADM

## 2019-12-12 RX ORDER — PROCHLORPERAZINE MALEATE 5 MG
10 TABLET ORAL EVERY 6 HOURS PRN
Status: DISCONTINUED | OUTPATIENT
Start: 2019-12-12 | End: 2019-12-14 | Stop reason: HOSPADM

## 2019-12-12 RX ORDER — ASPIRIN 81 MG/1
81 TABLET ORAL DAILY
Status: DISCONTINUED | OUTPATIENT
Start: 2019-12-13 | End: 2019-12-14 | Stop reason: HOSPADM

## 2019-12-12 RX ORDER — ROSUVASTATIN CALCIUM 20 MG/1
40 TABLET, COATED ORAL DAILY
Status: DISCONTINUED | OUTPATIENT
Start: 2019-12-12 | End: 2019-12-14 | Stop reason: HOSPADM

## 2019-12-12 RX ORDER — HEPARIN SODIUM 1000 [USP'U]/ML
INJECTION, SOLUTION INTRAVENOUS; SUBCUTANEOUS
Status: DISCONTINUED | OUTPATIENT
Start: 2019-12-12 | End: 2019-12-12 | Stop reason: HOSPADM

## 2019-12-12 RX ORDER — FENTANYL CITRATE 50 UG/ML
25-50 INJECTION, SOLUTION INTRAMUSCULAR; INTRAVENOUS
Status: ACTIVE | OUTPATIENT
Start: 2019-12-12 | End: 2019-12-13

## 2019-12-12 RX ORDER — ALUMINA, MAGNESIA, AND SIMETHICONE 2400; 2400; 240 MG/30ML; MG/30ML; MG/30ML
30 SUSPENSION ORAL EVERY 4 HOURS PRN
Status: DISCONTINUED | OUTPATIENT
Start: 2019-12-12 | End: 2019-12-14 | Stop reason: HOSPADM

## 2019-12-12 RX ORDER — PROCHLORPERAZINE 25 MG
25 SUPPOSITORY, RECTAL RECTAL EVERY 12 HOURS PRN
Status: DISCONTINUED | OUTPATIENT
Start: 2019-12-12 | End: 2019-12-14 | Stop reason: HOSPADM

## 2019-12-12 RX ORDER — MORPHINE SULFATE 2 MG/ML
2-4 INJECTION, SOLUTION INTRAMUSCULAR; INTRAVENOUS
Status: DISCONTINUED | OUTPATIENT
Start: 2019-12-12 | End: 2019-12-14 | Stop reason: HOSPADM

## 2019-12-12 RX ORDER — ATROPINE SULFATE 0.1 MG/ML
0.5 INJECTION INTRAVENOUS EVERY 5 MIN PRN
Status: ACTIVE | OUTPATIENT
Start: 2019-12-12 | End: 2019-12-13

## 2019-12-12 RX ORDER — NALOXONE HYDROCHLORIDE 0.4 MG/ML
.1-.4 INJECTION, SOLUTION INTRAMUSCULAR; INTRAVENOUS; SUBCUTANEOUS
Status: DISCONTINUED | OUTPATIENT
Start: 2019-12-12 | End: 2019-12-12

## 2019-12-12 RX ORDER — FENTANYL CITRATE 50 UG/ML
INJECTION, SOLUTION INTRAMUSCULAR; INTRAVENOUS
Status: DISCONTINUED | OUTPATIENT
Start: 2019-12-12 | End: 2019-12-12 | Stop reason: HOSPADM

## 2019-12-12 RX ADMIN — SODIUM CHLORIDE: 9 INJECTION, SOLUTION INTRAVENOUS at 15:56

## 2019-12-12 RX ADMIN — METOPROLOL TARTRATE 25 MG: 25 TABLET ORAL at 20:58

## 2019-12-12 RX ADMIN — ASPIRIN 81 MG 324 MG: 81 TABLET ORAL at 13:16

## 2019-12-12 RX ADMIN — ROSUVASTATIN CALCIUM 40 MG: 20 TABLET, FILM COATED ORAL at 20:58

## 2019-12-12 RX ADMIN — LISINOPRIL 2.5 MG: 2.5 TABLET ORAL at 16:08

## 2019-12-12 RX ADMIN — HEPARIN SODIUM 6000 UNITS: 1000 INJECTION, SOLUTION INTRAVENOUS; SUBCUTANEOUS at 13:21

## 2019-12-12 RX ADMIN — TICAGRELOR 180 MG: 90 TABLET ORAL at 13:16

## 2019-12-12 ASSESSMENT — ACTIVITIES OF DAILY LIVING (ADL)
SWALLOWING: 0-->SWALLOWS FOODS/LIQUIDS WITHOUT DIFFICULTY
AMBULATION: 0-->INDEPENDENT
FALL_HISTORY_WITHIN_LAST_SIX_MONTHS: NO
COGNITION: 0 - NO COGNITION ISSUES REPORTED
RETIRED_EATING: 0-->INDEPENDENT
BATHING: 0-->INDEPENDENT
TOILETING: 0-->INDEPENDENT
TRANSFERRING: 0-->INDEPENDENT
RETIRED_COMMUNICATION: 0-->UNDERSTANDS/COMMUNICATES WITHOUT DIFFICULTY
DRESS: 0-->INDEPENDENT

## 2019-12-12 ASSESSMENT — ENCOUNTER SYMPTOMS
CONFUSION: 0
FEVER: 0
NECK STIFFNESS: 0
COLOR CHANGE: 0
EYE REDNESS: 0
DIFFICULTY URINATING: 0
SHORTNESS OF BREATH: 0
HEADACHES: 0
ARTHRALGIAS: 0
ABDOMINAL PAIN: 0

## 2019-12-12 ASSESSMENT — MIFFLIN-ST. JEOR: SCORE: 1751.28

## 2019-12-12 NOTE — Clinical Note
Stent deployed in the distal right coronary artery. Max pressure = 12 corina. Total duration = 20 seconds. Balloon reinflated a second time: Max pressure = 16 corina. Total duration = 20 seconds.

## 2019-12-12 NOTE — Clinical Note
The first balloon was inserted into the right coronary artery and proximal right coronary artery.Max pressure = 16 corina. Total duration = 20 seconds.

## 2019-12-12 NOTE — PROVIDER NOTIFICATION
DATE:  12/12/2019   TIME OF RECEIPT FROM LAB:  4813  LAB TEST:  Troponin   LAB VALUE:  4.739  RESULTS GIVEN WITH READ-BACK TO (PROVIDER):  Dr. Rodriguez  TIME LAB VALUE REPORTED TO PROVIDER:   0658

## 2019-12-12 NOTE — ED PROVIDER NOTES
History     Chief Complaint   Patient presents with     Chest Pain     HPI  Richy Rogers is a 44 year old male significant past medical history for tobacco use disorder presents with substernal chest discomfort.  Rated 8/10 intensity.  Is been short of breath and mildly diaphoretic.  Onset of this discomfort was right between 6 and 7 AM at work this morning.  The patient has had discomfort sufficient where he had a nuclear medicine scan completed in the fall 2019.  I have not had the opportunity review that report but the patient verbally told me that he was informed it was normal.  The patient does have an extensive smoking history since age 15 1 to 2 packs of cigarettes per day.  Strong family history of his father having multiple myocardial events starting in his early 40s and is now with a left ventricular assist device.  Patient did not take aspirin prior to arrival.    Addendum to HPI: Patient and his wife initially went to a urgent care clinic in [place].  They were concerned chest pain might be consistent myocardial infarction and advised him to drive self to the [place] ED.  His wife did the driving.    Allergies:  No Known Allergies    Problem List:    Patient Active Problem List    Diagnosis Date Noted     Atypical chest pain 04/01/2019     Priority: Medium     Tobacco abuse disorder 04/01/2019     Priority: Medium        Past Medical History:    No past medical history on file.    Past Surgical History:    Past Surgical History:   Procedure Laterality Date     ESOPHAGOSCOPY, GASTROSCOPY, DUODENOSCOPY (EGD), COMBINED N/A 4/18/2019    Procedure: ESOPHAGOSCOPY, GASTROSCOPY, DUODENOSCOPY (EGD);  Surgeon: Steve Saleh MD;  Location:  GI       Family History:    Family History   Problem Relation Age of Onset     Hypertension Mother      Heart Disease Father         First MI in his 40s     Myocardial Infarction Father      Cancer Father         bladder and colon     Hypertension Father      Thyroid  "Disease Sister        Social History:  Marital Status:   [2]  Social History     Tobacco Use     Smoking status: Current Every Day Smoker     Packs/day: 0.25     Types: Cigarettes     Smokeless tobacco: Current User   Substance Use Topics     Alcohol use: Yes     Comment: weekends     Drug use: No        Medications:    ranitidine (ZANTAC) 150 MG capsule          Review of Systems   Constitutional: Negative for fever.   HENT: Negative for congestion.    Eyes: Negative for redness.   Respiratory: Negative for shortness of breath.    Cardiovascular: Positive for chest pain.   Gastrointestinal: Negative for abdominal pain.   Genitourinary: Negative for difficulty urinating.   Musculoskeletal: Negative for arthralgias and neck stiffness.   Skin: Negative for color change.   Neurological: Negative for headaches.   Psychiatric/Behavioral: Negative for confusion.   All other systems reviewed and are negative.      Physical Exam   BP: (!) 147/91  Pulse: 67  Resp: 17  Height: 180.3 cm (5' 11\")  Weight: 83.9 kg (185 lb)  SpO2: 100 %      Physical Exam  Vitals signs and nursing note reviewed.   Constitutional:       Appearance: He is not ill-appearing.   HENT:      Head: Normocephalic and atraumatic.      Right Ear: External ear normal.      Left Ear: External ear normal.      Nose: Nose normal.   Eyes:      General: Lids are normal. No scleral icterus.     Conjunctiva/sclera: Conjunctivae normal.      Pupils: Pupils are equal, round, and reactive to light.      Funduscopic exam:     Right eye: No hemorrhage.         Left eye: No hemorrhage.   Neck:      Musculoskeletal: Neck supple.      Vascular: No carotid bruit or JVD.      Trachea: Trachea normal.   Cardiovascular:      Rate and Rhythm: Normal rate and regular rhythm.  No extrasystoles are present.     Pulses: Normal pulses.      Heart sounds: S1 normal and S2 normal. No murmur. No systolic murmur. No friction rub.   Pulmonary:      Effort: Pulmonary effort is " normal. No respiratory distress.      Breath sounds: Normal breath sounds. No decreased breath sounds or wheezing.   Chest:      Chest wall: No deformity or tenderness.   Abdominal:      General: Bowel sounds are normal. There is no distension.      Palpations: Abdomen is soft. There is no hepatomegaly or splenomegaly.      Tenderness: There is no abdominal tenderness. There is no rebound.      Hernia: No hernia is present.   Musculoskeletal: Normal range of motion.      Right lower leg: No edema.      Left lower leg: No edema.   Lymphadenopathy:      Cervical: No cervical adenopathy.   Skin:     General: Skin is warm and dry.      Coloration: Skin is not pale.      Findings: No rash.   Neurological:      Mental Status: He is alert and oriented to person, place, and time.      Sensory: No sensory deficit.      Deep Tendon Reflexes: Reflexes are normal and symmetric.   Psychiatric:         Speech: Speech normal.         Behavior: Behavior normal.         ED Course        Procedures          EKG:  Interpretation by Torrey Paniagua DO.   Indication: Chest pain  Acute ST segment elevation in leads II, 3, aVF between 3 and 4 mm.  Reciprocal changes noted anterior.  Consistent acute inferior myocardial infarction.  Impressions: Acute STEMI        Critical Care time: 30 minutes           No results found for this or any previous visit (from the past 24 hour(s)).    Medications   aspirin (ASA) chewable tablet 324 mg (324 mg Oral Given 12/12/19 1316)   ticagrelor (BRILINTA) tablet 180 mg (180 mg Oral Given 12/12/19 1316)   heparin (porcine) injection (LOADING DOSE) (6,000 Units Intravenous Given 12/12/19 1321)       Assessments & Plan (with Medical Decision Making)  STEMI care activated soon as EKG was pleated.  Air care notified.  Transported to [facility].  Contacted Stephan Ortiz MD on call for interventional cardiology.  Spoke with the patient and his wife.  They are in agreement.  Appropriate STEMI intervention  has been initiated with aspirin, take Cangrelor, heparin bolus.  1:28 PM  Air care arrived 3 minutes ago.  Still having active discomfort.  Instructed paramedics to initiate IV nitro drip in route.  Did not want to delay transfer nitro to arrive from pharmacy.     I have reviewed the nursing notes.    I have reviewed the findings, diagnosis, plan and need for follow up with the patient.      New Prescriptions    No medications on file       Final diagnoses:   ST elevation myocardial infarction involving right coronary artery (H)       12/12/2019   [facility] EMERGENCY DEPARTMENT     Torrey Paniagua,   12/12/19 2175

## 2019-12-12 NOTE — ED TRIAGE NOTES
Chest pain that radiates to left arm and up the left side of his neck started at 0430 this morning while sitting at his desk.  Pain has gotten worse and was diaphoretic.  [facility] in [place] and they advised him to go to the ER

## 2019-12-12 NOTE — Clinical Note
The first balloon was inserted into the right coronary artery and middle right coronary artery.Max pressure = 8 corina. Total duration = 30 seconds.     Max pressure = 12 corina. Total duration = 30 seconds.    Balloon reinflated a second time: Max pressure = 12 corina. Total duration = 30 seconds.

## 2019-12-12 NOTE — H&P
[facility]    History and Physical  Hospitalist       Date of Admission:  12/12/2019    Assessment & Plan     This is a 44-year-old male with history of smoking 1 to 2 packs per week, hyperlipidemia, not on any medication who presented to the ER with complaint of chest pain, found to have ST elevation in the inferior leads, subsequently transferred to [facility] and taken to the Cath Lab and emergent left heart catheterization done which shows a culprit lesion in the RCA, which was stented with 2 drug eluting stents.  The Hospitalist Service is consulted now to admit the patient.      ASSESSMENT AND PLAN:   1.  Acute non-ST elevation myocardial infarction secondary to inferior wall myocardial infarction:  This is a 44-year-old male with history of smoking, hyperlipidemia and strong family history of heart disease.  Father has coronary artery disease and had a heart attack at the age of 40.  He is currently has an LVAD.   Brothers have hypertension.  Mother has hypertension.  He now presents with typical chest pain with nausea, vomiting, diaphoresis, ST elevations in the inferior leads.  He is status post PCI to the RCA which is 95% stenosed at the mid RCA level.  There was a proximal lesion of 75% stenosis and distal RCA 40% stenosis.  The patient is started on aspirin, Brilinta, metoprolol 25 mg b.i.d., Lisinopril 2.5 mg daily, and Crestor.  We will do serial cardiac enzymes.  Echocardiogram.  Cardiology will be following.  On further evaluation on left heart catheterization, there is other distal LAD lesion 90% stenosed.  There is a small second diagonal which is 90% stenosed, and a mid circumflex lesion 60% stenosed which does not appear to be flow-limiting.  He may need to staged PCI to his circumflex maybe later.  He is chest pain-free now.   2.  Hyperlipidemia:  We will check his lipid panel.  Continue with Crestor 40 mg daily.   3.  Tobacco abuse:  The patient smokes and has  a strong family history, now has stents.  Counseling given.  The patient agreed he will quit smoking.   4.  Deep venous thrombosis prophylaxis with sequential compressive devices.       CODE STATUS:  Full code.      The case was discussed with Dr. Ortiz of Interventional Cardiology and the nursing staff taking care of the patient.         JOSÉ RODRIGUEZ MD      DVT Prophylaxis: Low Risk/Ambulatory with no VTE prophylaxis indicated  Code Status: Full Code    Disposition: Expected discharge in 2 days once stable.    José Rodriguez MD    Primary Care Physician   Physician No Ref-Primary    Chief Complaint   Chest pain     History is obtained from the patient    History of Present Illness   Admitted:     12/12/2019      HISTORY OF PRESENT ILLNESS:  This is a 44-year-old male with history of smoking 1 to 2 packs per week, hyperlipidemia, not on any medication who presented to the ER with complaint of chest pain, found to have ST elevation in the inferior leads, subsequently transferred to [facility] and taken to the Cath Lab and emergent left heart catheterization done which shows a culprit lesion in the RCA, which was stented with 2 drug eluting stents.  The Hospitalist Service is consulted now to admit the patient.      According to the patient, he was having some chest pain in January and February of this year, intermittent chest pain  once or twice a week.  He has been evaluated by his primary care physician, had a stress test done in April which was negative for any reversible ischemia.  He had an EGD done to rule out dyspepsia as the cause of his symptoms which was negative as well.  This morning at around 6 a.m. when he was at work he started having chest pain and subsequently got worse, was not getting better.  He took some tums and it did not help him.  By the time of 11:00 a.m., he started having severe pain rated about 10/10 on pain scale that radiated to his jaw and neck, felt like pressure,  somebody standing or sitting on him, associated with nausea, vomiting and diaphoresis.  He went to the urgent care who referred him to the ER.  In the ER at [facility] he had an EKG done which showed ST elevations in leads II, III and aVF, ST depression in I and aVL and reciprocal depression in anterior leads.  He was subsequently transferred to [facility].      At this time, the patient is feeling much better.  Denies any chest pain, fever, chills, nausea, vomiting, headache, dizziness, lightheadedness.  No abdominal pain, back pain, dysuria, hematuria, constipation or diarrhea at this time.     Past Medical History    I have reviewed this patient's medical history and updated it with pertinent information if needed.   History reviewed. No pertinent past medical history.    Past Surgical History   I have reviewed this patient's surgical history and updated it with pertinent information if needed.  Past Surgical History:   Procedure Laterality Date     ESOPHAGOSCOPY, GASTROSCOPY, DUODENOSCOPY (EGD), COMBINED N/A 4/18/2019    Procedure: ESOPHAGOSCOPY, GASTROSCOPY, DUODENOSCOPY (EGD);  Surgeon: Steve Saleh MD;  Location:  GI       Prior to Admission Medications   Prior to Admission Medications   Prescriptions Last Dose Informant Patient Reported? Taking?   ranitidine (ZANTAC) 150 MG capsule   Yes No   Sig: Take 150 mg by mouth 2 times daily      Facility-Administered Medications: None     Allergies   No Known Allergies    Social History   I have reviewed this patient's social history and updated it with pertinent information if needed. Richy Rogers  reports that he has been smoking cigarettes. He has been smoking about 0.25 packs per day. He uses smokeless tobacco. He reports current alcohol use. He reports that he does not use drugs.    Family History   I have reviewed this patient's family history and updated it with pertinent information if needed.   Family History   Problem Relation  Age of Onset     Hypertension Mother      Heart Disease Father         First MI in his 40s     Myocardial Infarction Father      Cancer Father         bladder and colon     Hypertension Father      Thyroid Disease Sister        Review of Systems   CONSTITUTIONAL:  negative  EYES:  negative  HEENT:  negative  RESPIRATORY:  negative  CARDIOVASCULAR:  negative  GASTROINTESTINAL:  negative  GENITOURINARY:  negative  INTEGUMENT/BREAST:  negative  HEMATOLOGIC/LYMPHATIC:  negative  ALLERGIC/IMMUNOLOGIC:  negative  ENDOCRINE:  negative  MUSCULOSKELETAL:  negative  NEUROLOGICAL:  negative  BEHAVIOR/PSYCH:  negative    Physical Exam   Temp: 97.8  F (36.6  C) Temp src: Oral BP: 112/78 Pulse: 82 Heart Rate: 78 Resp: 16 SpO2: 100 % O2 Device: None (Room air)    Vital Signs with Ranges  Temp:  [97.8  F (36.6  C)] 97.8  F (36.6  C)  Pulse:  [67-82] 82  Heart Rate:  [70-90] 78  Resp:  [16-27] 16  BP: (112-161)/() 112/78  SpO2:  [99 %-100 %] 100 %  192 lbs 9.6 oz    Constitutional: Awake, alert, cooperative, no apparent distress.  Eyes: Conjunctiva and pupils examined and normal.  HEENT: Moist mucous membranes, normal dentition.  Respiratory: Clear to auscultation bilaterally, no crackles or wheezing.  Cardiovascular: Regular rate and rhythm, normal S1 and S2, and no murmur noted.  GI: Soft, non-distended, non-tender, normal bowel sounds.  Lymph/Hematologic: No anterior cervical or supraclavicular adenopathy.  Skin: No rashes, no cyanosis, no edema.  Musculoskeletal: No joint swelling, erythema or tenderness.  Neurologic: Cranial nerves 2-12 intact, normal strength and sensation.  Psychiatric: Alert, oriented to person, place and time, no obvious anxiety or depression.    Data   Data reviewed today:  I personally reviewed the EKG tracing showing NSR, ST elevation >2 mm in inferior leads, ST depression in I and aVL, recipocal depression in anterior leads .  Recent Labs   Lab 12/12/19  1317   WBC 13.8*   HGB 15.8   MCV 93   PLT  281   INR 1.08      POTASSIUM 4.3   CHLORIDE 108   CO2 30   BUN 23   CR 1.11   ANIONGAP 1*   RADHA 9.2   *   TROPI 0.342*       Recent Results (from the past 24 hour(s))   Cardiac Catheterization   Result Value    Cath EF Quantitative 40    Narrative      The ejection fraction is calculated to be 40%.    Left ventricular filling pressures are normal .    Prox RCA lesion is 75% stenosed.    Mid RCA-1 lesion is 95% stenosed.    Mid RCA-2 lesion is 70% stenosed.    Dist RCA-1 lesion is 40% stenosed.    Dist RCA-2 lesion is 40% stenosed.    1st RPL lesion is 25% stenosed.    Mid LAD lesion is 35% stenosed.    Dist LAD lesion is 90% stenosed.    2nd Diag lesion is 90% stenosed.    Mid Cx lesion is 60% stenosed.     1.Successful angioplasty and stenting of multiple stenoses through the   proximal mid and distal right coronary artery with telescoping 3.5 x 38   and 3.0 x 38 mm [employer] resolute [place] drug-eluting stents leaving a 0%   residual stenosis with JASEN grade III flow.  2.  Patient is left with a 90% apical LAD stenosis, and 90% stenosis in a   very small first diagonal and a 60% mid circumflex stenosis that does not   appear to be flow-limiting.  3.  Left ventricular end-diastolic pressure of 11.  No mitral   regurgitation or aortic stenosis.  Inferior wall hypokinesia ejection   fraction estimated to be 40 to 45%.

## 2019-12-12 NOTE — CONSULTS
Consult Date:  12/12/2019      CRITICAL CARE CONSULTATION      HISTORY OF PRESENT ILLNESS:  Richy is a 44-year-old gentleman with a strong family history of coronary artery disease with his father having his first myocardial infarction at age 44 and subsequently having multiple other myocardial infarctions and he is still alive.  Richy, unfortunately, has smoked cigarettes since about the age of 14 and has a 40-pack-year smoking history.  He has untreated hyperlipidemia.  Denies having diabetes mellitus or hypertension.      At approximately 6 a.m. this morning, he began experiencing chest discomfort.  He had similar symptoms last spring that were milder and had a stress nuclear scan that was normal.  This morning symptoms were much worse, it radiated into his left arm, left side of his neck.  He was nauseated, vomited, had some diaphoresis.  He was seen at the [facility] where EKG was consistent with acute inferior wall ST segment elevation myocardial infarction.  He was promptly treated with aspirin, Brilinta and heparin, and with a phone consultation, we decided to transfer him directly to [facility] via helicopter for acute intervention.      Upon arrival, the patient was pain-free and quite comfortable.  EKG on telemetry appeared to have normalized.      PAST MEDICAL HISTORY:  Significant for:   1.  Tobacco abuse.   2.  Hyperlipidemia.      MEDICATIONS:  On admission include ranitidine.      ALLERGIES:  NO KNOWN DRUG ALLERGIES.      HABITS:  Smokes 5-10 cigarettes per day.  He occasionally drinks alcohol, usually just weekends.      SURGICAL HISTORY:  He has no past surgical history; he did have endoscopy.      FAMILY HISTORY:  Significant for mother having hypertension and his father having coronary disease, as outlined above.      REVIEW OF SYSTEMS:  Completely negative other than those issues mentioned above.      PHYSICAL EXAMINATION:   GENERAL:  Demonstrates a young gentleman  in no acute distress.   HEENT:  Pupils equal and round.  Sclerae anicteric.  Conjunctivas not injected.  Oral mucosa is pink and moist without lesion or cyanosis.   NECK:  Supple, there is no jugular venous distention or carotid bruits.   CHEST:  Clear to auscultation.   CARDIAC:  Regular rate and rhythm.  I hear no murmur, rub or gallop.   ABDOMEN:  Soft, nontender.   EXTREMITIES:  Without edema, he has 2+ pulses.   SKIN:  Warm and dry without clubbing, cyanosis or rash.   NEUROLOGIC:  Nonfocal.      LABORATORY DATA:  Electrolytes are within normal limits with a creatinine of 1.1.  Hemoglobin is 15.8 with a platelet count of 281,000.  EKG shows normal sinus rhythm with prominent ST segment elevations in II, III and aVF up to 5 mm.  He has minimal ST segment elevation in V4-V6 and some ST segment depressions in I and aVL.      ASSESSMENT AND PLAN:  Scarlet is having an acute inferior wall ST segment elevation.  It appears that he may have reperfused with his Brilinta, aspirin, heparin and nitro, received up at [facility].  I will proceed with coronary angiography and intervention as appropriate.  I discussed risks, benefits and alternatives with the patient.  He appears to understand and desires to proceed.      The patient received smoking cessation counseling.      I will send off a lipid profile and empirically start him on rosuvastatin 40.      I will start beta blockers and ACE inhibitors as tolerated.      Further evaluation and treatment will depend upon the above results.      Thirty minutes of critical care time was spent in coordinating care with the [facility] Emergency Room, cardiac catheterization lab here, Cardiac Intensive Care Unit, hospitalist and general cardiologist.         NICOLASA TEMPLE MD, [facility]             D: 2019   T: 2019   MT: CHETAN      Name:     SCARLET SO   MRN:      [id number]        Account:       [id number]   :      1975           Consult  Date:  12/12/2019      Document: [id number]

## 2019-12-12 NOTE — ED PROVIDER NOTES
History     Chief Complaint   Patient presents with     Chest Pain     The history is provided by the patient.     Richy Rogers is a 44 year old male who presents to the emergency department for chest pain. Patient reports having left sided chest pain that radiates to his left arm and left side of his neck since this morning around 0600. He states the pain has gotten worse and is diaphoretic. He was at the [facility] [place] and was advised to go to the ED. He states he has chest pain in the past (just had a nuclear stress done), however, the pain has never been this bad. He denies any history of diabetes or HTN. He is a smoker for about 30 years. He reports his father has had 4 MI's in the past, first one being when he was about 44 years old.    Allergies:  No Known Allergies    Problem List:    Patient Active Problem List    Diagnosis Date Noted     Atypical chest pain 04/01/2019     Priority: Medium     Tobacco abuse disorder 04/01/2019     Priority: Medium        Past Medical History:    No past medical history on file.    Past Surgical History:    Past Surgical History:   Procedure Laterality Date     ESOPHAGOSCOPY, GASTROSCOPY, DUODENOSCOPY (EGD), COMBINED N/A 4/18/2019    Procedure: ESOPHAGOSCOPY, GASTROSCOPY, DUODENOSCOPY (EGD);  Surgeon: Steve Saleh MD;  Location:  GI       Family History:    Family History   Problem Relation Age of Onset     Hypertension Mother      Heart Disease Father         First MI in his 40s     Myocardial Infarction Father      Cancer Father         bladder and colon     Hypertension Father      Thyroid Disease Sister        Social History:  Marital Status:   [2]  Social History     Tobacco Use     Smoking status: Current Every Day Smoker     Packs/day: 0.25     Types: Cigarettes     Smokeless tobacco: Current User   Substance Use Topics     Alcohol use: Yes     Comment: weekends     Drug use: No        Medications:    ranitidine (ZANTAC) 150 MG  "capsule          Review of Systems   All other systems reviewed and are negative.      Physical Exam   BP: (!) 147/91  Pulse: 67  Resp: 17  Height: 180.3 cm (5' 11\")  Weight: 83.9 kg (185 lb)  SpO2: 100 %      Physical Exam  Vitals signs and nursing note reviewed.         ED Course        Procedures          {EKG done?:886429::\" \"}    Critical Care time:  {none or minutes:730611::\"none\"}  {Trauma Activation or Fall?:675373::\" \"}  {Sepsis/Septic Shock/Stemi/Stroke:269989::\" \"}         No results found for this or any previous visit (from the past 24 hour(s)).    Medications - No data to display    Assessments & Plan (with Medical Decision Making)     I have reviewed the nursing notes.    I have reviewed the findings, diagnosis, plan and need for follow up with the patient.  {ED Addendum:391974::\" \"}    New Prescriptions    No medications on file       Final diagnoses:   None     This document serves as a record of services personally performed by Jason Paniagua DO. It was created on their behalf by Jinny Stark, a trained medical scribe. The creation of this record is based on the provider's personal observations and the statements of the patient. This document has been checked and approved by the attending provider.    Note: Chart documentation done in part with Dragon Voice Recognition software. Although reviewed after completion, some word and grammatical errors may remain.    12/12/2019   [facility] EMERGENCY DEPARTMENT  "

## 2019-12-12 NOTE — H&P
Admitted:     12/12/2019      HISTORY OF PRESENT ILLNESS:  This is a 44-year-old male with history of smoking 1 to 2 packs per week, hyperlipidemia, not on any medication who presented to the ER with complaint of chest pain, found to have ST elevation in the inferior leads, subsequently transferred to [facility] and taken to the Cath Lab and emergent left heart catheterization done which shows a culprit lesion in the RCA, which was stented with 2 drug eluting stents.  The Hospitalist Service is consulted now to admit the patient.      According to the patient, he was having some chest pain in January and February of this year, intermittent chest pain  once or twice a week.  He has been evaluated by his primary care physician, had a stress test done in April which was negative for any reversible ischemia.  He had an EGD done to rule out dyspepsia as the cause of his symptoms which was negative as well.  This morning at around 6 a.m. when he was at work he started having chest pain and subsequently got worse, was not getting better.  He took some tums and it did not help him.  By the time of 11:00 a.m., he started having severe pain rated about 10/10 on pain scale that radiated to his jaw and neck, felt like pressure, somebody standing or sitting on him, associated with nausea, vomiting and diaphoresis.  He went to the urgent care who referred him to the ER.  In the ER at [facility] he had an EKG done which showed ST elevations in leads II, III and aVF, ST depression in I and aVL and reciprocal depression in anterior leads.  He was subsequently transferred to [facility].      At this time, the patient is feeling much better.  Denies any chest pain, fever, chills, nausea, vomiting, headache, dizziness, lightheadedness.  No abdominal pain, back pain, dysuria, hematuria, constipation or diarrhea at this time.      ASSESSMENT AND PLAN:   1.  Acute non-ST elevation myocardial  infarction secondary to inferior wall myocardial infarction:  This is a 44-year-old male with history of smoking, hyperlipidemia and strong family history of heart disease.  Father has coronary artery disease and had a heart attack at the age of 40.  He is currently has an LVAD.   Brothers have hypertension.  Mother has hypertension.  He now presents with typical chest pain with nausea, vomiting, diaphoresis, ST elevations in the inferior leads.  He is status post PCI to the RCA which is 95% stenosed at the mid RCA level.  There was a proximal lesion of 75% stenosis and distal RCA 40% stenosis.  The patient is started on aspirin, Brilinta, metoprolol 25 mg b.i.d., Lisinopril 2.5 mg daily, and Crestor.  We will do serial cardiac enzymes.  Echocardiogram.  Cardiology will be following.  On further evaluation on left heart catheterization, there is other distal LAD lesion 90% stenosed.  There is a small second diagonal which is 90% stenosed, and a mid circumflex lesion 60% stenosed which does not appear to be flow-limiting.  He may need to staged PCI to his circumflex maybe later.  He is chest pain-free now.   2.  Hyperlipidemia:  We will check his lipid panel.  Continue with Crestor 40 mg daily.   3.  Tobacco abuse:  The patient smokes and has a strong family history, now has stents.  Counseling given.  The patient agreed he will quit smoking.   4.  Deep venous thrombosis prophylaxis with sequential compressive devices.       CODE STATUS:  Full code.      The case was discussed with Dr. Ortiz of Interventional Cardiology and the nursing staff taking care of the patient.         TAY PEÑA MD             D: 2019   T: 2019   MT:       Name:     MICHELE SON   MRN:      [id number]        Account:      [id number]   :      1975        Admitted:     2019                   Document: [id number]

## 2019-12-13 ENCOUNTER — APPOINTMENT (OUTPATIENT)
Dept: OCCUPATIONAL THERAPY | Facility: CLINIC | Age: 44
DRG: 247 | End: 2019-12-13
Attending: INTERNAL MEDICINE
Payer: COMMERCIAL

## 2019-12-13 ENCOUNTER — APPOINTMENT (OUTPATIENT)
Dept: CARDIOLOGY | Facility: CLINIC | Age: 44
DRG: 247 | End: 2019-12-13
Attending: INTERNAL MEDICINE
Payer: COMMERCIAL

## 2019-12-13 LAB
ANION GAP SERPL CALCULATED.3IONS-SCNC: 4 MMOL/L (ref 3–14)
BUN SERPL-MCNC: 16 MG/DL (ref 7–30)
CALCIUM SERPL-MCNC: 8.8 MG/DL (ref 8.5–10.1)
CHLORIDE SERPL-SCNC: 108 MMOL/L (ref 94–109)
CO2 SERPL-SCNC: 25 MMOL/L (ref 20–32)
CREAT SERPL-MCNC: 0.97 MG/DL (ref 0.66–1.25)
ERYTHROCYTE [DISTWIDTH] IN BLOOD BY AUTOMATED COUNT: 12.7 % (ref 10–15)
GFR SERPL CREATININE-BSD FRML MDRD: >90 ML/MIN/{1.73_M2}
GLUCOSE SERPL-MCNC: 92 MG/DL (ref 70–99)
HCT VFR BLD AUTO: 44 % (ref 40–53)
HGB BLD-MCNC: 14.6 G/DL (ref 13.3–17.7)
MCH RBC QN AUTO: 30.7 PG (ref 26.5–33)
MCHC RBC AUTO-ENTMCNC: 33.2 G/DL (ref 31.5–36.5)
MCV RBC AUTO: 92 FL (ref 78–100)
PLATELET # BLD AUTO: 254 10E9/L (ref 150–450)
POTASSIUM SERPL-SCNC: 4.2 MMOL/L (ref 3.4–5.3)
RBC # BLD AUTO: 4.76 10E12/L (ref 4.4–5.9)
SODIUM SERPL-SCNC: 137 MMOL/L (ref 133–144)
TROPONIN I SERPL-MCNC: 11.94 UG/L (ref 0–0.04)
TROPONIN I SERPL-MCNC: 16.16 UG/L (ref 0–0.04)
WBC # BLD AUTO: 9.5 10E9/L (ref 4–11)

## 2019-12-13 PROCEDURE — 97165 OT EVAL LOW COMPLEX 30 MIN: CPT | Mod: GO | Performed by: OCCUPATIONAL THERAPIST

## 2019-12-13 PROCEDURE — 36415 COLL VENOUS BLD VENIPUNCTURE: CPT | Performed by: INTERNAL MEDICINE

## 2019-12-13 PROCEDURE — 84484 ASSAY OF TROPONIN QUANT: CPT | Performed by: INTERNAL MEDICINE

## 2019-12-13 PROCEDURE — 25500064 ZZH RX 255 OP 636

## 2019-12-13 PROCEDURE — 80048 BASIC METABOLIC PNL TOTAL CA: CPT | Performed by: INTERNAL MEDICINE

## 2019-12-13 PROCEDURE — 97110 THERAPEUTIC EXERCISES: CPT | Mod: GO | Performed by: OCCUPATIONAL THERAPIST

## 2019-12-13 PROCEDURE — 93306 TTE W/DOPPLER COMPLETE: CPT | Mod: 26 | Performed by: INTERNAL MEDICINE

## 2019-12-13 PROCEDURE — 99207 ZZC CDG-MDM COMPONENT: MEETS MODERATE - UP CODED: CPT | Performed by: INTERNAL MEDICINE

## 2019-12-13 PROCEDURE — 99207 ZZC MOONLIGHTING INDICATOR: CPT | Performed by: INTERNAL MEDICINE

## 2019-12-13 PROCEDURE — 25000132 ZZH RX MED GY IP 250 OP 250 PS 637: Performed by: INTERNAL MEDICINE

## 2019-12-13 PROCEDURE — 97535 SELF CARE MNGMENT TRAINING: CPT | Mod: GO | Performed by: OCCUPATIONAL THERAPIST

## 2019-12-13 PROCEDURE — 85027 COMPLETE CBC AUTOMATED: CPT | Performed by: INTERNAL MEDICINE

## 2019-12-13 PROCEDURE — 99232 SBSQ HOSP IP/OBS MODERATE 35: CPT | Performed by: INTERNAL MEDICINE

## 2019-12-13 PROCEDURE — 40000264 ECHOCARDIOGRAM COMPLETE

## 2019-12-13 PROCEDURE — 99233 SBSQ HOSP IP/OBS HIGH 50: CPT | Mod: 25 | Performed by: INTERNAL MEDICINE

## 2019-12-13 PROCEDURE — 21000001 ZZH R&B HEART CARE

## 2019-12-13 RX ADMIN — ROSUVASTATIN CALCIUM 40 MG: 20 TABLET, FILM COATED ORAL at 20:10

## 2019-12-13 RX ADMIN — METOPROLOL TARTRATE 25 MG: 25 TABLET ORAL at 09:05

## 2019-12-13 RX ADMIN — LISINOPRIL 2.5 MG: 2.5 TABLET ORAL at 09:07

## 2019-12-13 RX ADMIN — TICAGRELOR 90 MG: 90 TABLET ORAL at 21:24

## 2019-12-13 RX ADMIN — ASPIRIN 81 MG: 81 TABLET, DELAYED RELEASE ORAL at 09:06

## 2019-12-13 RX ADMIN — TICAGRELOR 90 MG: 90 TABLET ORAL at 14:20

## 2019-12-13 RX ADMIN — METOPROLOL TARTRATE 25 MG: 25 TABLET ORAL at 20:10

## 2019-12-13 RX ADMIN — TICAGRELOR 90 MG: 90 TABLET ORAL at 01:14

## 2019-12-13 RX ADMIN — HUMAN ALBUMIN MICROSPHERES AND PERFLUTREN 9 ML: 10; .22 INJECTION, SOLUTION INTRAVENOUS at 09:00

## 2019-12-13 ASSESSMENT — ACTIVITIES OF DAILY LIVING (ADL): PREVIOUS_RESPONSIBILITIES: MEAL PREP;HOUSEKEEPING;LAUNDRY;SHOPPING;YARDWORK;MEDICATION MANAGEMENT;FINANCES;DRIVING;WORK

## 2019-12-13 NOTE — CONSULTS
Medication coverage check for Brilinta. $182.14 monthly with copay supplement coupon after free trial month.    Dilma Rodriguez CphT  [facility] Liaison  Liaison Cell: [phone]

## 2019-12-13 NOTE — PROGRESS NOTES
12/13/19 0917   Quick Adds   Type of Visit Initial Occupational Therapy Evaluation   Living Environment   Lives With spouse   Living Arrangements house   Home Accessibility stairs to enter home;stairs within home   Number of Stairs, Main Entrance 2   Number of Stairs, Within Home, Primary 8   Transportation Anticipated car, drives self;family or friend will provide   Self-Care   Regular Exercise No   Activity/Exercise/Self-Care Comment enjoys fishing and hunting, cutting wood   Functional Level   Ambulation 0-->independent   Transferring 0-->independent   Toileting 0-->independent   Bathing 0-->independent   Dressing 0-->independent   Eating 0-->independent   Communication 0-->understands/communicates without difficulty   Swallowing 0-->swallows foods/liquids without difficulty   Cognition 0 - no cognition issues reported   Fall history within last six months no   Prior Functional Level Comment works full time as a , office job.   General Information   Onset of Illness/Injury or Date of Surgery - Date 12/12/19   Referring Physician Kevin Shields MD   Patient/Family Goals Statement home   Additional Occupational Profile Info/Pertinent History of Current Problem STEMI, s/p PCI of proximal mid and distal right coronary artery    Precautions/Limitations   (MI precautions)   Heart Disease Risk Factors Smoking;High blood pressure;Lack of physical activity;Dislipidemia;Stress;Family history;Gender   Cognitive Status Examination   Orientation orientation to person, place and time   Level of Consciousness alert   Follows Commands (Cognition) WNL   Memory intact   Attention No deficits were identified   Executive Function No deficits were identified   Sensory Examination   Sensory Quick Adds No deficits were identified   Pain Assessment   Patient Currently in Pain No   Transfer Skills   Transfer Comments Pt I with ADL's and functional mobility.   Instrumental Activities of Daily Living (IADL)  "  Previous Responsibilities meal prep;housekeeping;laundry;shopping;yardwork;medication management;finances;driving;work   Activities of Daily Living Analysis   Impairments Contributing to Impaired Activities of Daily Living post surgical precautions   General Therapy Interventions   Planned Therapy Interventions risk factor education;progressive activity/exercise;home program guidelines   Clinical Impression   Criteria for Skilled Therapeutic Interventions Met yes, treatment indicated   OT Diagnosis decreased IADL's   Influenced by the following impairments MI precautions   Assessment of Occupational Performance 1-3 Performance Deficits   Identified Performance Deficits impaired IADL's ie snow removal, vacuuming, etc   Clinical Decision Making (Complexity) Low complexity   Therapy Frequency 2x/day   Predicted Duration of Therapy Intervention (days/wks) 2 days   Anticipated Discharge Disposition Home with Outpatient Therapy  (OP CR at [facility])   Risks and Benefits of Treatment have been explained. Yes   Patient, Family & other staff in agreement with plan of care Yes   [facility] AM-PAC  \"6 Clicks\" Daily Activity Inpatient Short Form   1. Putting on and taking off regular lower body clothing? 4 - None   2. Bathing (including washing, rinsing, drying)? 4 - None   3. Toileting, which includes using toilet, bedpan or urinal? 4 - None   4. Putting on and taking off regular upper body clothing? 4 - None   5. Taking care of personal grooming such as brushing teeth? 4 - None   6. Eating meals? 4 - None   Daily Activity Raw Score (Score out of 24.Lower scores equate to lower levels of function) 24   Total Evaluation Time   Total Evaluation Time (Minutes) 10     "

## 2019-12-13 NOTE — PROGRESS NOTES
[facility] Cardiology Progress Note  Date of Service: 12/13/2019  Primary Cardiologist: will be Dr. Shields    Richy Rogers is a 44 year old male admitted on 12/12/2019 with inferior STEMI.    Subjective: Feels well, no recurrent chest pain, denies dyspnea or discomfort at L radial access site.     Assessment:  1. CAD, inferior STEMI, s/p PCI of proximal mid and distal right coronary artery with telescoping 3.5 x 38 and 3.0 x 38 mm Medtronic resolute Antonio drug-eluting stents on 12/12. Residual 90% apical LAD lesion, as well as 90% D1 (small artery) and 60% mLCX lesion. Trop peak @ 16. No arrhythmias on tele.   2. Mild ischemic CMP - EF 40-45% on LV gram; TTE pending.  3. Tobacco use - counseled.   4. Strong FH of CAD  5. Hyperlipidemia - , now on statin.   6. Prediabetes    Plan:   1. Continue aspirin, Brilinta, statin, beta blocker and ACEi.   2. Cardiac rehab.   3. Tobacco cessation.  4. TTE pending - will review.  5. Potential for home tomorrow. Should discharge with SL NTG.   6. Will arrange f/u in [place]. Could consider staged intervention of apical LAD and an FFR evaluation of the circumflex coronary artery if symptomatic.     Dilma Merino PA-C  [facility]  Pager: [phone]  Text Page  (7:30am - 4pm M-F)   __________________________________________________________________________    Physical Exam   Temp: 98.8  F (37.1  C) Temp src: Oral BP: 117/77 Pulse: 79 Heart Rate: 70 Resp: 14 SpO2: 96 % O2 Device: None (Room air)    Vitals:    12/12/19 1516   Weight: 87.4 kg (192 lb 9.6 oz)       GENERAL:  The patient is in no apparent distress.   NECK: CVP appears normal, no masses or thyromegaly.  PULMONARY:  There is a normal respiratory effort. Clear lungs to auscultation bilaterally.   CARDIOVASCULAR:  RRR, normal S1 S2, no m/r/g.  GI:  Non tender abdomen with normoactive bowel sounds and no hepatosplenomegaly. There are no masses palpable.   EXTREMITIES:  No  clubbing, cyanosis or edema.  VASCULAR: 2+ Pulses bilaterally in upper and lower extremities. L radial access site C/D/I, with some edema but no evidence of hematoma or pseudoaneurysm.    Medications     Continuing ACE inhibitor/ARB/ARNI from home medication list OR ACE inhibitor/ARB order already placed during this visit       - MEDICATION INSTRUCTIONS -       - MEDICATION INSTRUCTIONS -       - MEDICATION INSTRUCTIONS -       Reason anticoagulation order not selected       - MEDICATION INSTRUCTIONS -       Percutaneous Coronary Intervention orders placed (this is information for BPA alerting)         aspirin  81 mg Oral Daily     lisinopril  2.5 mg Oral Daily     metoprolol tartrate  25 mg Oral BID     rosuvastatin  40 mg Oral Daily     sodium chloride (PF)  3 mL Intracatheter Q8H     ticagrelor  90 mg Oral Q12H       Data   Most Recent 3 CBC's:  Recent Labs   Lab Test 12/13/19  0539 12/12/19  1317 04/01/19  1554   WBC 9.5 13.8* 8.8   HGB 14.6 15.8 15.2   MCV 92 93 92    281 264     Most Recent 3 BMP's:  Recent Labs   Lab Test 12/13/19  0539 12/12/19  1317 04/01/19  1554    139 140   POTASSIUM 4.2 4.3 4.4   CHLORIDE 108 108 105   CO2 25 30 27   BUN 16 23 18   CR 0.97 1.11 1.10   ANIONGAP 4 1* 8   RADHA 8.8 9.2 9.1   GLC 92 110* 90     Most Recent 3 Troponin's:  Recent Labs   Lab Test 12/13/19  0539 12/13/19  0013 12/12/19  2148   TROPI 11.935* 16.156* 14.999*     Most Recent Cholesterol Panel:  Recent Labs   Lab Test 12/12/19  1550   CHOL 212*   *   HDL 37*   TRIG 159*     Most Recent TSH and T4:  Recent Labs   Lab Test 04/01/19  1554   TSH 2.31     Most Recent Hemoglobin A1c:  Recent Labs   Lab Test 12/12/19  1550   A1C 5.9*

## 2019-12-13 NOTE — CONSULTS
NUTRITION EDUCATION    REASON FOR ASSESSMENT:  Nutrition education on American Heart Association (AHA) Heart Healthy Diet.    NUTRITION HISTORY:  Information obtained from Richy and wife  -Pt is familiar w/ HH diet because of family history of heart disease  -Pt does all of the cooking, pt and wife only go out to eat once or twice every couple of weeks  -Pt and wife are not big fruit and vegetable eaters, but stated they are willing to try to eat more of them    Diet Recall:  Breakfast:  Leftovers from dinner like chili  Lunch:  More of like a snack, will have popcorn and/or crackers  Dinner:  Steak, chicken, or hamburger helper  Ice cream    CURRENT DIET ORDER:  Low Saturated Fat Na < 2400 mg, no caffeine for 24 hours    NUTRITION DIAGNOSIS:  Limited adherence to nutrition-related recommendations R/t HH diet AEB diet recall high in sodium    INTERVENTIONS:  Nutrition Prescription:    Recommended AHA Heart Healthy Diet    Implementation:     Nutrition Education (Content):  a) reviewed Heart Healthy Diet guidelines  b) provided heart healthy diet handout    Nutrition Education (Application):  a) Discussed current eating habits and recommended alternative food choices    Anticipate good compliance    Diet Education - refer to Education flowsheet    Goals:    Patient verbalizes understanding of diet     All of the above goals met during education session    Follow Up/Monitoring:    Provided RD contact information for future questions    Loren Taveras  Dietetic Intern

## 2019-12-13 NOTE — PLAN OF CARE
Discharge Planner OT   Patient plan for discharge: home  Current status: Eval completed and treatment initiated. Pt lives in a house and works full time at a desk job and enjoys fishing, hunting, etc and reports he was supposed to be ice fishing today. Pt admitted due to STEMI, s/p angio with GEORGE to RCAx2, per pt and wife, needs to have a staged intervention in future.     Pt completed TDM with gradual increase to 2.4 mph for 12 mins and completed 15 stairs and ambulated approx 100 ftx2 to CR room and back, no reported symptoms and vitals stable, see flow sheet for details. Pt and pt's wife educated in CAD risk factors with focus low sodium and low cholesterol diet and exercise and quitting smoking, pt reports he has quit in the past for 5  years but back to it after having a cigar. Pt plans on quitting cold turkey and his wife is going to try as well.   Barriers to return to prior living situation: none  Recommendations for discharge: home with A with strenuous IADL's ie snow removal, vacuuming, pt encouraged to ask MD about ice fishing and return to work and OP CR at [facility] or [facility].   Rationale for recommendations: pt doing well, reports feeling well with exercise and motivated to attend OP CR for monitored progressive exercise and risk factor education and modification at discharge.         Entered by: Mary Hernandez 12/13/2019 9:59 AM

## 2019-12-13 NOTE — PROGRESS NOTES
[facility]  Hospitalist Progress Note for 12/13/2019:          Assessment and Plan:   Mr Richy Rogers is a 44-year-old male with history of smoking 1 to 2 packs per week, hyperlipidemia, not on any medication who presented to the ER with complaint of chest pain, found to have ST elevation in the inferior leads, subsequently transferred to [facility] and taken to the Cath Lab and emergent left heart catheterization.     CAD, inferior wall STEMI:   Ischemic cardiomyopathy LVEF 40-45%:   Pt with a history of smoking, hyperlipidemia and strong family history of heart disease. Presents with typical chest pain with nausea, vomiting, diaphoresis, ST elevations in the inferior leads, he was started on aspirin, Brilinta, metoprolol 25 mg b.i.d., Lisinopril 2.5 mg daily, and Crestor.   - s/p successful angioplasty and stenting of multiple stenoses through the proximal mid and distal right coronary arteryproximal mid and distal right coronary artery.  - cardiac cath also showed 90% apical LAD stenosis, and 90% stenosis in a very small first diagonal and a 60% mid circumflex stenosis that does not appear to be flow-limiting. LVEF estimated at 40-45%.with a plan for staged PCI next month  - This morning he remains chest pain-free now.   - Peak troponin 16.156 and trended down  -This morning shows a left ventricle ejection fraction reduced with an EF of 50 to 55%, hypokinesis of the basal to mid inferior, mid inferolateral, mid inferior lateral, apical lateral, mid anterolateral wall segments.  - Patient on new- 81 mgASA, Brilinta, Lopressor, lisinopril & Crestor.  - Cardiac rehab  - Further plans per cardiology     Hyperlipidemia, mixed:    lipid panel-total cholesterol 212  withLDL 143, triglycerides 159, low HDL  -Started on admission Crestor 40 mg daily, continue     Prediabetes, new diagnosis:  - A1c 5.9.  Fasting blood sugar 92 this morning  -Patient will need outpatient diabetic  education and close monitoring of his prediabetes  - Consider starting him on metformin as an outpatient  -Recommended follow-up with PCP     Tobacco abuse:    The patient smokes 1 pack/week and has a strong family history, now has stents.    Counseling given.  The patient agreed he will quit smoking.      DVT prophylaxis: sequential compressive devices.       CODE STATUS:  Full combosis prophylaxisde.      Disposition: Affected discharge home likely tomorrow.  Plans per cardiology.  Discussed with the patient and his wife at bedside.    Bryanna Borges MD.  Hospitalist [phone] (7am -6 pm)                   Interval History:   Has been chest pain free since admission. Denies sob or new c/o.Pt's wife at bedside. ECHO tech at bedside ready to start echo.              Medications:       aspirin  81 mg Oral Daily     lisinopril  2.5 mg Oral Daily     metoprolol tartrate  25 mg Oral BID     [COMPLETED] perflutren diluted 1mL to 2mL with saline  9 mL Intravenous Once     rosuvastatin  40 mg Oral Daily     sodium chloride (PF)  3 mL Intracatheter Q8H     ticagrelor  90 mg Oral Q12H     acetaminophen, acetaminophen, alum & mag hydroxide-simethicone, Continuing ACE inhibitor/ARB/ARNI from home medication list OR ACE inhibitor/ARB order already placed during this visit, - MEDICATION INSTRUCTIONS -, - MEDICATION INSTRUCTIONS -, - MEDICATION INSTRUCTIONS -, Reason anticoagulation order not selected, HOLD MEDICATION, lidocaine 4%, lidocaine (buffered or not buffered), - MEDICATION INSTRUCTIONS -, morphine, naloxone, nitroGLYcerin, ondansetron **OR** ondansetron, Percutaneous Coronary Intervention orders placed (this is information for BPA alerting), prochlorperazine **OR** prochlorperazine **OR** prochlorperazine, sodium chloride (PF)               Physical Exam:   Blood pressure 104/71, pulse 71, temperature 98.8  F (37.1  C), temperature source Oral, resp. rate 14, weight 87.4 kg (192 lb 9.6 oz), SpO2 100 %.  Wt Readings  from Last 4 Encounters:   19 87.4 kg (192 lb 9.6 oz)   19 83.9 kg (185 lb)   19 83.9 kg (185 lb)   19 87.9 kg (193 lb 12.8 oz)         Vital Sign Ranges  Temperature Temp  Av.3  F (36.8  C)  Min: 97.8  F (36.6  C)  Max: 98.8  F (37.1  C)   Blood pressure Systolic (24hrs), Av , Min:104 , Max:161        Diastolic (24hrs), Av, Min:71, Max:111      Pulse Pulse  Av.1  Min: 62  Max: 90   Respirations Resp  Av.4  Min: 13  Max: 27   Pulse oximetry SpO2  Av.5 %  Min: 97 %  Max: 100 %         Intake/Output Summary (Last 24 hours) at 2019 0857  Last data filed at 2019 0851  Gross per 24 hour   Intake 480 ml   Output 800 ml   Net -320 ml     Pt's wife at bedside  Constitutional: Awake, alert, cooperative, no apparent distress   Lungs: Clear to auscultation bilaterally, no crackles or wheezing   Cardiovascular: Regular rate and rhythm, normal S1 and S2, and no murmur noted   Abdomen: Normal bowel sounds, soft, non-distended, non-tender   Skin: No rashes, no cyanosis, no edema   Neuro:                Data:   All laboratory data reviewed

## 2019-12-13 NOTE — PHARMACY-ADMISSION MEDICATION HISTORY
Pharmacy Medication History  Admission medication history interview status for the 12/12/2019  admission is complete. See EPIC admission navigator for prior to admission medications     Medication history sources: Patient  Medication history source reliability: Good  Adherence assessment: Good    Significant changes made to the medication list:  Removed zantac, added omeprazole      Additional medication history information:   none    Medication reconciliation completed by provider prior to medication history? No    Time spent in this activity:  mins      Prior to Admission medications    Medication Sig Last Dose Taking? Auth Provider   omeprazole (PRILOSEC) 20 MG DR capsule Take 20 mg by mouth every morning (before breakfast) 12/12/2019 at Unknown time Yes Unknown, Entered By History     Matilde Kingston, PharmD

## 2019-12-13 NOTE — PLAN OF CARE
Plan: Post angiogram with 2 stents to RCA. Left radial site. ECHO Friday.     Neuro: A/Ox4    Cardiac: SR, VSS. Lisinopril started this afternoon. Troponin up from 0.342 to 4.739 after angio. No chest pain. Patient has multivessel disease and will need further intervention.     Respiratory: RA, clear lungs    Drips: NS @ 75    Sites: Left radial site. TR band on with 0cc left. CDI, CMS intact    GI/: started on cardiac diet.      Activity: up with 1    Discharge:  TBD - in 1-2 days.     Aggression tool: Green

## 2019-12-13 NOTE — PLAN OF CARE
Pt A&Ox4 w/ VSS. RA. S/p angio w/ intervention. Left TR site WDL. Good pulses & CMS. Denies pain. Wife at bedside. Continue to monitor.

## 2019-12-14 ENCOUNTER — APPOINTMENT (OUTPATIENT)
Dept: OCCUPATIONAL THERAPY | Facility: CLINIC | Age: 44
DRG: 247 | End: 2019-12-14
Payer: COMMERCIAL

## 2019-12-14 VITALS
HEART RATE: 92 BPM | BODY MASS INDEX: 26.86 KG/M2 | OXYGEN SATURATION: 98 % | TEMPERATURE: 98.8 F | WEIGHT: 192.6 LBS | DIASTOLIC BLOOD PRESSURE: 67 MMHG | SYSTOLIC BLOOD PRESSURE: 98 MMHG | RESPIRATION RATE: 16 BRPM

## 2019-12-14 LAB — INTERPRETATION ECG - MUSE: NORMAL

## 2019-12-14 PROCEDURE — 99232 SBSQ HOSP IP/OBS MODERATE 35: CPT | Performed by: INTERNAL MEDICINE

## 2019-12-14 PROCEDURE — 97110 THERAPEUTIC EXERCISES: CPT | Mod: GO | Performed by: OCCUPATIONAL THERAPIST

## 2019-12-14 PROCEDURE — 99239 HOSP IP/OBS DSCHRG MGMT >30: CPT | Performed by: INTERNAL MEDICINE

## 2019-12-14 PROCEDURE — 25000132 ZZH RX MED GY IP 250 OP 250 PS 637: Performed by: INTERNAL MEDICINE

## 2019-12-14 RX ORDER — LISINOPRIL 2.5 MG/1
2.5 TABLET ORAL DAILY
Qty: 30 TABLET | Refills: 0 | Status: SHIPPED | OUTPATIENT
Start: 2019-12-15 | End: 2020-01-08

## 2019-12-14 RX ORDER — NITROGLYCERIN 0.4 MG/1
TABLET SUBLINGUAL
Qty: 30 TABLET | Refills: 0 | Status: SHIPPED | OUTPATIENT
Start: 2019-12-14 | End: 2020-08-25

## 2019-12-14 RX ORDER — METOPROLOL TARTRATE 25 MG/1
25 TABLET, FILM COATED ORAL 2 TIMES DAILY
Qty: 60 TABLET | Refills: 0 | Status: SHIPPED | OUTPATIENT
Start: 2019-12-14 | End: 2020-01-08

## 2019-12-14 RX ORDER — ROSUVASTATIN CALCIUM 40 MG/1
40 TABLET, COATED ORAL DAILY
Qty: 30 TABLET | Refills: 0 | Status: SHIPPED | OUTPATIENT
Start: 2019-12-14 | End: 2020-01-08

## 2019-12-14 RX ADMIN — METOPROLOL TARTRATE 25 MG: 25 TABLET ORAL at 08:58

## 2019-12-14 RX ADMIN — LISINOPRIL 2.5 MG: 2.5 TABLET ORAL at 08:58

## 2019-12-14 RX ADMIN — ASPIRIN 81 MG: 81 TABLET, DELAYED RELEASE ORAL at 08:58

## 2019-12-14 RX ADMIN — TICAGRELOR 90 MG: 90 TABLET ORAL at 08:58

## 2019-12-14 NOTE — PLAN OF CARE
No changes, denies any chest pain or discomfort. Up independently. SR on the monitor. Plan for discharge tomorrow.

## 2019-12-14 NOTE — PLAN OF CARE
Discharge Planner OT   Patient plan for discharge: home  Current status: Pt tolerated 20 minutes on the treadmill starting at 1.6 mph and getting up to 2.4 mph.  Completed a flight of stairs.  Vitals stable, pt rated exertion at a 4/10.  Barriers to return to prior living situation: None  Recommendations for discharge: home with A with strenuous IADL's ie snow removal, vacuuming, pt encouraged to ask MD about ice fishing and return to work and OP CR at [facility] or [facility].   Rationale for recommendations: Pt tolerated exercise well, motivated to continue to improve through rehab for monitored progressive exercise and risk factor education and modification at discharge.         Entered by: Jose Diaz 12/14/2019 9:05 AM        Occupational Therapy Discharge Summary    Reason for therapy discharge:    Discharged to home with outpatient therapy.    Progress towards therapy goal(s). See goals on Care Plan in [facility] electronic health record for goal details.  Goals met    Therapy recommendation(s):    Continued therapy is recommended.  Rationale/Recommendations:  Recommend continued CR in a Phase II program to increase endurance and knowledge of heart health behaviors.

## 2019-12-14 NOTE — PROGRESS NOTES
EP- Cardiology Progress Note           Assessment and Plan:     44-yo M with Hx of HL, preDM who p/w Inf STEMI (12/12/2019; peak Tn of 16).    Echo:  EF of 50-55% with HK in inferior wall        Plan:  1. Inferior STEMI. S/p PCI to RCA.  Residual 90% apical LAD lesion, as well as 90% D1 (small artery) and 60% mLCX lesion.  He is chest pain-free blood pressures well controlled.  Recommendations:  - Continue aspirin, Brilinta, statin, beta blocker and ACEi.   -Consider staged intervention of distal LAD intervention as outpatient.    -Follow-up in cardiology clinic in a week.      Okay to discharge today if no issues arise.         Physical Exam:  Vitals: BP 98/67   Pulse 92   Temp 98.8  F (37.1  C) (Oral)   Resp 16   Wt 87.4 kg (192 lb 9.6 oz)   SpO2 98%   BMI 26.86 kg/m        Intake/Output Summary (Last 24 hours) at 12/14/2019 0948  Last data filed at 12/13/2019 1900  Gross per 24 hour   Intake 220 ml   Output --   Net 220 ml     Vitals:    12/12/19 1516   Weight: 87.4 kg (192 lb 9.6 oz)       Constitutional:  AAO x3.  Pt is in NAD.  HEAD: Normocephalic.  SKIN: Skin normal color, texture and turgor with no lesions or eruptions.  Eyes: PERRL, EOMI.  ENT:  Supple, normal JVP. No lymphadenopathy or thyroid enlargement.  Chest:  CTAB.  Cardiac:   RRR, normal  S1 and S2.  No murmurs rubs or gallop.    Abdomen:  Normal BS.  Soft, non-tender and non-distended.  No rebound or guarding.    Extremities:  Pedious pulses palpable B/L.  No LE edema noticed.   Neurological: Strength and sensation grossly symmetric and intact throughout.                            Review of Systems:   As per subjective, otherwise 5 systems reviewed and negative.         Medications:          aspirin  81 mg Oral Daily     lisinopril  2.5 mg Oral Daily     metoprolol tartrate  25 mg Oral BID     rosuvastatin  40 mg Oral Daily     sodium chloride (PF)  3 mL Intracatheter Q8H     ticagrelor  90 mg Oral Q12H     PRN Meds: acetaminophen,  acetaminophen, alum & mag hydroxide-simethicone, Continuing ACE inhibitor/ARB/ARNI from home medication list OR ACE inhibitor/ARB order already placed during this visit, - MEDICATION INSTRUCTIONS -, - MEDICATION INSTRUCTIONS -, - MEDICATION INSTRUCTIONS -, Reason anticoagulation order not selected, lidocaine 4%, lidocaine (buffered or not buffered), - MEDICATION INSTRUCTIONS -, morphine, naloxone, nitroGLYcerin, ondansetron **OR** ondansetron, Percutaneous Coronary Intervention orders placed (this is information for BPA alerting), prochlorperazine **OR** prochlorperazine **OR** prochlorperazine, sodium chloride (PF)             Data:     Recent Labs   Lab 12/13/19  0539 12/13/19  0013 12/12/19  2148  12/12/19  1317   WBC 9.5  --   --   --  13.8*   HGB 14.6  --   --   --  15.8   MCV 92  --   --   --  93     --   --   --  281   INR  --   --   --   --  1.08     --   --   --  139   POTASSIUM 4.2  --   --   --  4.3   CHLORIDE 108  --   --   --  108   CO2 25  --   --   --  30   BUN 16  --   --   --  23   CR 0.97  --   --   --  1.11   ANIONGAP 4  --   --   --  1*   RADHA 8.8  --   --   --  9.2   GLC 92  --   --   --  110*   TROPI 11.935* 16.156* 14.999*   < > 0.342*    < > = values in this interval not displayed.

## 2019-12-14 NOTE — DISCHARGE SUMMARY
[facility]    Discharge Summary  Hospitalist    Date of Admission:  12/12/2019  Date of Discharge:  12/14/2019  Discharging Provider: José Rodriguez MD  Date of Service (when I saw the patient): 12/14/19    Discharge Diagnoses   Acute inferior wall ST elevation MI.  Hyperlipidemia    History of Present Illness   Richy Rogers is an 44 year old male who presented with Chest pain    Hospital Course   This is a 44-year-old male with history of smoking 1 to 2 packs per week, hyperlipidemia, not on any medication who presented to the ER with complaint of chest pain, found to have ST elevation in the inferior leads, subsequently transferred to [facility] and taken to the Cath Lab and emergent left heart catheterization done which shows a culprit lesion in the RCA, which was stented with 2 drug eluting stents.  The Hospitalist Service is consulted now to admit the patient.      Final discharge diagnoses and hospital course    1.  Acute non-ST elevation myocardial infarction secondary to inferior wall myocardial infarction:  This is a 44-year-old male with history of smoking, hyperlipidemia and strong family history of heart disease.  Father has coronary artery disease and had a heart attack at the age of 40.     Brothers have hypertension.  Mother has hypertension.  He now presents with typical chest pain with nausea, vomiting, diaphoresis, ST elevations in the inferior leads.  He is status post PCI to the RCA which is 95% stenosed at the mid RCA level.  There was a proximal lesion of 75% stenosis and distal RCA 40% stenosis.  The patient is started on aspirin, Brilinta, metoprolol 25 mg b.i.d., Lisinopril 2.5 mg daily, and Crestor.   Cardiology will be following.  On further evaluation on left heart catheterization, there is other distal LAD lesion 90% stenosed.  There is a small second diagonal which is 90% stenosed, and a mid circumflex lesion 60% stenosed which does not appear to be  flow-limiting.  He may need to staged PCI, follow-up with cardiology as scheduled.  He is chest pain-free now.   Cardiology has evaluated the patient recommend discharging the patient home.  Discussed with him the course of Brilinta he is okay with that.  All his medication sent to discharge pharmacy.  At this time the patient is cleared by the cardiology and will be discharged home in stable condition.    2.  Hyperlipidemia:   Lipid panel shows LDL of 143, target is 70, Continue with Crestor 40 mg daily.   Discussed with diet exercise and medication.    3.  Tobacco abuse:  The patient smokes and has a strong family history, now has stents.  Counseling given.  The patient agreed he will quit smoking.     At this time the patient will be discharged home in stable condition.       José Rodriguez MD, MD    Significant Results and Procedures   Coronary angiogram    The ejection fraction is calculated to be 40%.    Left ventricular filling pressures are normal .    Prox RCA lesion is 75% stenosed.    Mid RCA-1 lesion is 95% stenosed.    Mid RCA-2 lesion is 70% stenosed.    Dist RCA-1 lesion is 40% stenosed.    Dist RCA-2 lesion is 40% stenosed.    1st RPL lesion is 25% stenosed.    Mid LAD lesion is 35% stenosed.    Dist LAD lesion is 90% stenosed.    2nd Diag lesion is 90% stenosed.    Mid Cx lesion is 60% stenosed.     1.Successful angioplasty and stenting of multiple stenoses through the proximal mid and distal right coronary artery with telescoping 3.5 x 38 and 3.0 x 38 mm Medtronic resolute [place] drug-eluting stents leaving a 0% residual stenosis with JASEN grade III flow.  2.  Patient is left with a 90% apical LAD stenosis, and 90% stenosis in a very small first diagonal and a 60% mid circumflex stenosis that does not appear to be flow-limiting.  3.  Left ventricular end-diastolic pressure of 11.  No mitral regurgitation or aortic stenosis.  Inferior wall hypokinesia ejection fraction estimated to be 40 to 45%.           Plan     1. ASA 81 mg daily indefinitely.  2. P2 Y 12 inhibitor for 1 year.  3. Maximize statin therapy.  4. Beta-blockers and ACE inhibitors as tolerated.  5. Mediterranean-style diet.  6. Daily exercise.  7. Cardiac rehab.  8. Discontinue cigarette smoking.  9. Consider staged intervention of apical LAD and an FFR evaluation of the circumflex coronary artery.     Echocardiogram    Interpretation Summary     Left ventricular systolic function is borderline reduced. LVEF is estimated at  50-55%. Hypokinesis of the basal to mid inferior, mid inferoseptal, mid  inferolateral, apical lateral, mid anterolateral wall segments.  Right ventricle is normal in structure, function and size.  No significant valvular abnormalities.  There are no prior studies available for comparison.    Pending Results   These results will be followed up by PCP  Unresulted Labs Ordered in the Past 30 Days of this Admission     No orders found from 11/12/2019 to 12/13/2019.          Code Status   Full Code       Primary Care Physician   Physician No Ref-Primary    Physical Exam   Temp: 98.8  F (37.1  C) Temp src: Oral BP: 98/67(post CR exercise) Pulse: 92 Heart Rate: 80   SpO2: 98 % O2 Device: None (Room air)    Vitals:    12/12/19 1516   Weight: 87.4 kg (192 lb 9.6 oz)     Vital Signs with Ranges  Temp:  [97.7  F (36.5  C)-98.8  F (37.1  C)] 98.8  F (37.1  C)  Pulse:  [92-96] 92  Heart Rate:  [80-81] 80  BP: ()/(67-84) 98/67  SpO2:  [98 %-99 %] 98 %  I/O last 3 completed shifts:  In: 220 [P.O.:220]  Out: -     Constitutional: awake, alert, cooperative, no apparent distress, and appears stated age  Eyes: Lids and lashes normal, pupils equal, round and reactive to light, extra ocular muscles intact, sclera clear, conjunctiva normal  Respiratory: No increased work of breathing, good air exchange, clear to auscultation bilaterally, no crackles or wheezing  Cardiovascular: Normal apical impulse, regular rate and rhythm, normal S1 and S2, no  S3 or S4, and no murmur noted  GI: No scars, normal bowel sounds, soft, non-distended, non-tender, no masses palpated, no hepatosplenomegally  Skin: no bruising or bleeding  Musculoskeletal: no lower extremity pitting edema present  Neurologic: Awake, alert, oriented to name, place and time.  Cranial nerves II-XII are grossly intact.  Motor is 5 out of 5 bilaterally.  Cerebellar finger to nose, heel to shin intact.  Sensory is intact.  Babinski down going, Romberg negative, and gait is normal.    Discharge Disposition   Discharged to home  Condition at discharge: Stable    Consultations This Hospital Stay   PHARMACY LIAISON FOR MEDICATION COVERAGE CONSULT  NUTRITION SERVICES ADULT IP CONSULT  CARDIAC REHAB IP CONSULT  PHARMACY IP CONSULT  PHARMACY IP CONSULT  CARDIAC REHAB IP CONSULT  SMOKING CESSATION PROGRAM IP CONSULT  SMOKING CESSATION PROGRAM IP CONSULT    Time Spent on this Encounter   IJosé MD, personally saw the patient today and spent greater than 30 minutes discharging this patient.    Discharge Orders      Basic metabolic panel     CBC with platelets    Last Lab Result: Hemoglobin (g/dL)       Date                     Value                 12/13/2019               14.6             ----------     CARDIAC REHAB REFERRAL      Discharge Order: F/U with Cardiac  CANDY      Reason for your hospital stay    STEMI     Follow-up and recommended labs and tests     Follow up with primary care provider, Physician No Ref-Primary, within 7 days for hospital follow- up.  No follow up labs or test are needed.     Activity    Your activity upon discharge: activity as tolerated     Full Code     Diet    Follow this diet upon discharge: Orders Placed This Encounter      Combination Diet Low Saturated Fat Na <2400mg Diet; No Caffeine for 24 hours (once tests completed, may have caffeine)     Discharge Medications   Current Discharge Medication List      START taking these medications    Details   aspirin (ASA) 81 MG  EC tablet Take 1 tablet (81 mg) by mouth daily  Qty: 90 tablet, Refills: 0    Comments: Further refill from his PCP  Associated Diagnoses: Acute ST elevation myocardial infarction (STEMI) involving left anterior descending (LAD) coronary artery (H)      lisinopril (PRINIVIL/ZESTRIL) 2.5 MG tablet Take 1 tablet (2.5 mg) by mouth daily  Qty: 30 tablet, Refills: 0    Comments: Further refill from his PCP  Associated Diagnoses: Acute ST elevation myocardial infarction (STEMI) involving left anterior descending (LAD) coronary artery (H)      metoprolol tartrate (LOPRESSOR) 25 MG tablet Take 1 tablet (25 mg) by mouth 2 times daily  Qty: 60 tablet, Refills: 0    Comments: Further refill from his PCP  Associated Diagnoses: Acute ST elevation myocardial infarction (STEMI) involving left anterior descending (LAD) coronary artery (H)      nitroGLYcerin (NITROSTAT) 0.4 MG sublingual tablet For chest pain place 1 tablet under the tongue every 5 minutes for 3 doses. If symptoms persist 5 minutes after 1st dose call 911.  Qty: 30 tablet, Refills: 0    Comments: Further refill from his PCP  Associated Diagnoses: Acute ST elevation myocardial infarction (STEMI) involving left anterior descending (LAD) coronary artery (H)      rosuvastatin (CRESTOR) 40 MG tablet Take 1 tablet (40 mg) by mouth daily  Qty: 30 tablet, Refills: 0    Comments: Further refill from his PCP  Associated Diagnoses: Acute ST elevation myocardial infarction (STEMI) involving left anterior descending (LAD) coronary artery (H)      ticagrelor (BRILINTA) 90 MG tablet Take 1 tablet (90 mg) by mouth every 12 hours  Qty: 60 tablet, Refills: 0    Comments: Further refill from his PCP  Associated Diagnoses: Acute ST elevation myocardial infarction (STEMI) involving left anterior descending (LAD) coronary artery (H)         CONTINUE these medications which have NOT CHANGED    Details   omeprazole (PRILOSEC) 20 MG DR capsule Take 20 mg by mouth every morning (before  breakfast)           Allergies   No Known Allergies  Data   Most Recent 3 CBC's:  Recent Labs   Lab Test 19  0539 19  1317 19  1554   WBC 9.5 13.8* 8.8   HGB 14.6 15.8 15.2   MCV 92 93 92    281 264      Most Recent 3 BMP's:  Recent Labs   Lab Test 19  0539 19  1317 19  1554    139 140   POTASSIUM 4.2 4.3 4.4   CHLORIDE 108 108 105   CO2 25 30 27   BUN 16 23 18   CR 0.97 1.11 1.10   ANIONGAP 4 1* 8   RADHA 8.8 9.2 9.1   GLC 92 110* 90     Most Recent 2 LFT's:  Recent Labs   Lab Test 19  1554   AST 16   ALT 49   ALKPHOS 70   BILITOTAL 0.4     Most Recent INR's and Anticoagulation Dosing History:  Anticoagulation Dose History     Recent Dosing and Labs Latest Ref Rng & Units 2019    INR 0.86 - 1.14 1.08        Most Recent 3 Troponin's:  Recent Labs   Lab Test 19  0539 19  0013 19  2148   TROPI 11.935* 16.156* 14.999*     Most Recent Cholesterol Panel:  Recent Labs   Lab Test 19  1550   CHOL 212*   *   HDL 37*   TRIG 159*     Most Recent 6 Bacteria Isolates From Any Culture (See EPIC Reports for Culture Details):No lab results found.  Most Recent TSH, T4 and A1c Labs:  Recent Labs   Lab Test 19  1550 19  1554   TSH  --  2.31   A1C 5.9*  --      Results for orders placed or performed during the hospital encounter of 19   Echocardiogram Complete    Narrative    [id number]  [id number]  [id number]  [id number]^MAVERICK^NICOLASA^C           [facility]  Echocardiography Laboratory  [facility] [id number]        Name: SORAYAMACIEJSCARLET  MRN: [id number]  : 1975  Study Date: 2019 08:27 AM  Age: 44 yrs  Gender: Male  Patient Location: [facility]  Reason For Study: CAD  Ordering Physician: NICOLASA TEMPLE  Performed By: So Ho     BSA: 2.1 m2  Height: 71 in  Weight: 192 lb  HR: 72  BP: 107/72 mmHg  _____________________________________________________________________________  __         Procedure  Complete Portable Echo Adult. Optison (NDC #[id number]) given intravenously.  _____________________________________________________________________________  __        Interpretation Summary     Left ventricular systolic function is borderline reduced. LVEF is estimated at  50-55%. Hypokinesis of the basal to mid inferior, mid inferoseptal, mid  inferolateral, apical lateral, mid anterolateral wall segments.  Right ventricle is normal in structure, function and size.  No significant valvular abnormalities.  There are no prior studies available for comparison.     _____________________________________________________________________________  __        Left Ventricle  The left ventricle is normal in size. There is concentric remodeling present.  Left ventricular systolic function is borderline reduced. The visual ejection  fraction is estimated at 50-55%. Grade I or early diastolic dysfunction.  Hypokinesis of the basal to mid inferior, mid inferoseptal, mid inferolateral,  apical lateral, mid anterolateral wall segments.     Right Ventricle  The right ventricle is normal in structure, function and size.     Atria  Normal left atrial size. Right atrial size is normal. Intact atrial septum.     Mitral Valve  The mitral valve leaflets appear normal. There is no evidence of stenosis,  fluttering, or prolapse. There is physiologic mitral regurgitation.        Tricuspid Valve  The tricuspid valve is not well visualized, but is grossly normal. Right  ventricular systolic pressure could not be approximated due to inadequate  tricuspid regurgitation.     Aortic Valve  The aortic valve is normal in structure and function.     Pulmonic Valve  The pulmonic valve is not well seen, but is grossly normal.     Vessels  The aortic root is normal size. Normal size ascending aorta. The inferior vena  cava was normal in size with preserved respiratory variability.     Pericardium  There is no pericardial effusion.      _____________________________________________________________________________  __  MMode/2D Measurements & Calculations  IVSd: 1.0 cm  LVIDd: 4.2 cm  LVIDs: 2.9 cm  LVPWd: 1.2 cm  FS: 29.7 %     LV mass(C)d: 153.1 grams  LV mass(C)dI: 73.8 grams/m2  Ao root diam: 3.3 cm  LA dimension: 3.5 cm  asc Aorta Diam: 2.8 cm  LA/Ao: 1.1  RWT: 0.55        Doppler Measurements & Calculations  MV E max senia: 66.4 cm/sec  MV A max senia: 42.2 cm/sec  MV E/A: 1.6  MV dec time: 0.17 sec  PA acc time: 0.09 sec  E/E' av.5  Lateral E/e': 5.8  Medial E/e': 9.1              _____________________________________________________________________________  __        Report approved by: Gume Rivas 2019 10:50 AM      Cardiac Catheterization     Value    Cath EF Quantitative 40    Narrative      The ejection fraction is calculated to be 40%.    Left ventricular filling pressures are normal .    Prox RCA lesion is 75% stenosed.    Mid RCA-1 lesion is 95% stenosed.    Mid RCA-2 lesion is 70% stenosed.    Dist RCA-1 lesion is 40% stenosed.    Dist RCA-2 lesion is 40% stenosed.    1st RPL lesion is 25% stenosed.    Mid LAD lesion is 35% stenosed.    Dist LAD lesion is 90% stenosed.    2nd Diag lesion is 90% stenosed.    Mid Cx lesion is 60% stenosed.     1.Successful angioplasty and stenting of multiple stenoses through the   proximal mid and distal right coronary artery with telescoping 3.5 x 38   and 3.0 x 38 mm Medtronic resolute [place] drug-eluting stents leaving a 0%   residual stenosis with JASEN grade III flow.  2.  Patient is left with a 90% apical LAD stenosis, and 90% stenosis in a   very small first diagonal and a 60% mid circumflex stenosis that does not   appear to be flow-limiting.  3.  Left ventricular end-diastolic pressure of 11.  No mitral   regurgitation or aortic stenosis.  Inferior wall hypokinesia ejection   fraction estimated to be 40 to 45%.         Most Recent 3 CBC's:  Recent Labs   Lab Test 19  5715  12/12/19  1317 04/01/19  1554   WBC 9.5 13.8* 8.8   HGB 14.6 15.8 15.2   MCV 92 93 92    281 264     Most Recent 3 BMP's:  Recent Labs   Lab Test 12/13/19  0539 12/12/19  1317 04/01/19  1554    139 140   POTASSIUM 4.2 4.3 4.4   CHLORIDE 108 108 105   CO2 25 30 27   BUN 16 23 18   CR 0.97 1.11 1.10   ANIONGAP 4 1* 8   RADHA 8.8 9.2 9.1   GLC 92 110* 90

## 2019-12-14 NOTE — DISCHARGE INSTRUCTIONS
Cardiac Angiogram Discharge Instructions - Radial    After you go home:      Have an adult stay with you until tomorrow.    Drink extra fluids for 2 days.    You may resume your normal diet.    No smoking       For 24 hours - due to the sedation you received:    Relax and take it easy.    Do NOT make any important or legal decisions.    Do NOT drive or operate machines at home or at work.    Do NOT drink alcohol.    Care of Wrist Puncture Site:      For the first 24 hrs - check the puncture site every 1-2 hours while awake.    It is normal to have soreness at the puncture site and mild tingling in your hand for up to 3 days.    Remove the bandaid after 24 hours. If there is minor oozing, apply another bandaid and remove it after 12 hours.    You may shower tomorrow.  Do NOT take a bath, or use a hot tub or pool for at least 3 days. Do NOT scrub the site. Do not use lotion or powder near the puncture site.           Activity:        For 2 days:     do not use your hand or arm to support your weight (such as rising from a chair)     do not bend your wrist (such as lifting a garage door).    do not lift more than 5 pounds or exercise your arm (such as tennis, golf or bowling).    Do NOT do any heavy activity such as exercise, lifting, or straining.     Bleeding:      If you start bleeding from the site in your wrist, sit down and press firmly on/above the site for 10 minutes.     Once bleeding stops, keep arm still for 2 hours.     Call [facility] as soon as you can.       Call 911 right away if you have heavy bleeding or bleeding that does not stop.      Medicines:      If you are taking an antiplatelet medication such as Plavix, Brilinta or Effient, do not stop taking it until you talk to your cardiologist.        If you are on Metformin (Glucophage), do not restart it until you have blood tests (within 2 to 3 days after discharge).  After you have your blood drawn, you may restart the Metformin.     Take your  medications, including blood thinners, unless your provider tells you not to.  If you take Coumadin (Warfarin), have your INR checked by your provider in  3-5 days. Call your clinic to schedule this.    If you have stopped any medicines, check with your provider about when to restart them.    Follow Up Appointments:      Follow up with [facility] Nurse Practitioner at [facility] of patient preference in 7-10 days.    Call the clinic if:      You have a large or growing hard lump around the site.    The site is red, swollen, hot or tender.    Blood or fluid is draining from the site.    You have chills or a fever greater than 101 F (38 C).    Your arm feels numb, cool or changes color.    You have hives, a rash or unusual itching.    Any questions or concerns.          [facility] [place]:    [phone] [facility] (7 days a week)

## 2019-12-14 NOTE — PLAN OF CARE
NSG DISCHARGE NOTE    Patient discharged to home at 12:33 PM via ambulation. Accompanied by spouse and daughter and staff. Discharge instructions reviewed with patient, spouse, and daughter, opportunity offered to ask questions. Prescriptions filled and sent with patient upon discharge. All belongings sent with patient.    Mariama Schmitt RN

## 2019-12-16 ENCOUNTER — OFFICE VISIT (OUTPATIENT)
Dept: CARDIOLOGY | Facility: CLINIC | Age: 44
End: 2019-12-16
Payer: COMMERCIAL

## 2019-12-16 ENCOUNTER — TELEPHONE (OUTPATIENT)
Dept: FAMILY MEDICINE | Facility: CLINIC | Age: 44
End: 2019-12-16

## 2019-12-16 VITALS
HEART RATE: 92 BPM | SYSTOLIC BLOOD PRESSURE: 110 MMHG | OXYGEN SATURATION: 95 % | DIASTOLIC BLOOD PRESSURE: 74 MMHG | BODY MASS INDEX: 26.42 KG/M2 | WEIGHT: 188.7 LBS | HEIGHT: 71 IN

## 2019-12-16 DIAGNOSIS — I25.10 CORONARY ARTERY DISEASE INVOLVING NATIVE HEART, ANGINA PRESENCE UNSPECIFIED, UNSPECIFIED VESSEL OR LESION TYPE: Primary | ICD-10-CM

## 2019-12-16 PROCEDURE — 99214 OFFICE O/P EST MOD 30 MIN: CPT | Performed by: INTERNAL MEDICINE

## 2019-12-16 ASSESSMENT — MIFFLIN-ST. JEOR: SCORE: 1768.07

## 2019-12-16 NOTE — PROGRESS NOTES
"HISTORY OF PRESENT ILLNESS:  ASymptomatic. Questions about other blockages and prevention of problems in future.    Orders this Visit:  No orders of the defined types were placed in this encounter.    No orders of the defined types were placed in this encounter.    There are no discontinued medications.    No diagnosis found.    CURRENT MEDICATIONS:  Current Outpatient Medications   Medication Sig Dispense Refill     aspirin (ASA) 81 MG EC tablet Take 1 tablet (81 mg) by mouth daily 90 tablet 0     lisinopril (PRINIVIL/ZESTRIL) 2.5 MG tablet Take 1 tablet (2.5 mg) by mouth daily 30 tablet 0     metoprolol tartrate (LOPRESSOR) 25 MG tablet Take 1 tablet (25 mg) by mouth 2 times daily 60 tablet 0     omeprazole (PRILOSEC) 20 MG DR capsule Take 20 mg by mouth every morning (before breakfast)       rosuvastatin (CRESTOR) 40 MG tablet Take 1 tablet (40 mg) by mouth daily 30 tablet 0     ticagrelor (BRILINTA) 90 MG tablet Take 1 tablet (90 mg) by mouth every 12 hours 60 tablet 0     nitroGLYcerin (NITROSTAT) 0.4 MG sublingual tablet For chest pain place 1 tablet under the tongue every 5 minutes for 3 doses. If symptoms persist 5 minutes after 1st dose call 911. (Patient not taking: Reported on 12/16/2019) 30 tablet 0       ALLERGIES   No Known Allergies    PAST MEDICAL, SURGICAL, FAMILY, SOCIAL HISTORY:  History was reviewed and updated as needed, see medical record.    Review of Systems:  A 12-point review of systems was completed, see medical record for detailed review of systems information.    Physical Exam:  Vitals: /74 (BP Location: Left arm, Patient Position: Fowlers, Cuff Size: Adult Regular)   Pulse 92   Ht 1.803 m (5' 11\")   Wt 85.6 kg (188 lb 11.2 oz)   SpO2 95%   BMI 26.32 kg/m      Constitutional:           Skin:           Head:           Eyes:           ENT:           Neck:           Chest:           Cardiac:                    Abdomen:           Vascular:                                    "     Extremities and Back:           Neurological:           ASSESSMENT: Stable CAD        RECOMMENDATIONS:  Rehab x 2 weeks if stable can return without limitations   Follow-up NP in one month      Recent Lab Results:  LIPID RESULTS:  Lab Results   Component Value Date    CHOL 212 (H) 12/12/2019    HDL 37 (L) 12/12/2019     (H) 12/12/2019    TRIG 159 (H) 12/12/2019       LIVER ENZYME RESULTS:  Lab Results   Component Value Date    AST 16 04/01/2019    ALT 49 04/01/2019       CBC RESULTS:  Lab Results   Component Value Date    WBC 9.5 12/13/2019    RBC 4.76 12/13/2019    HGB 14.6 12/13/2019    HCT 44.0 12/13/2019    MCV 92 12/13/2019    MCH 30.7 12/13/2019    MCHC 33.2 12/13/2019    RDW 12.7 12/13/2019     12/13/2019       BMP RESULTS:  Lab Results   Component Value Date     12/13/2019    POTASSIUM 4.2 12/13/2019    CHLORIDE 108 12/13/2019    CO2 25 12/13/2019    ANIONGAP 4 12/13/2019    GLC 92 12/13/2019    BUN 16 12/13/2019    CR 0.97 12/13/2019    GFRESTIMATED >90 12/13/2019    GFRESTBLACK >90 12/13/2019    RADHA 8.8 12/13/2019        A1C RESULTS:  Lab Results   Component Value Date    A1C 5.9 (H) 12/12/2019       INR RESULTS:  Lab Results   Component Value Date    INR 1.08 12/12/2019       We greatly appreciate the opportunity to be involved in the care of your patient, Richy Rogers.    Sincerely,  Maged Rodriguez MD      CC  No referring provider defined for this encounter.

## 2019-12-16 NOTE — TELEPHONE ENCOUNTER
"Hospital/TCU/ED for chronic condition Discharge Protocol    \"Hi, my name is Marysol Acosta RN, a registered nurse, and I am calling from [facility].  I am calling to follow up and see how things are going for you after your recent emergency visit/hospital/TCU stay.\"    Consent to communicate on file for his wife.4/1/19.    Tell me how you are doing now that you are home?\" good      Discharge Instructions    \"Let's review your discharge instructions.  What is/are the follow-up recommendations?  Pt. Response: cardiology today, cardiac rehab  The information they got was conflicting  \"Has an appointment with your primary care provider been scheduled?\"   Yes. (confirm)    \"When you see the provider, I would recommend that you bring your medications with you.\"    Medications    \"Tell me what changed about your medicines when you discharged?\"    Changes to chronic meds?    2 or more - Epic MTM referral needed    \"What questions do you have about your medications?\"    None     New diagnoses of heart failure, COPD, diabetes, or MI?    Yes - Care Coordination Referral needed              Post Discharge Medication Reconciliation Status: unable to reconcile discharge medications due to appointment today.    Was MTM referral placed (*Make sure to put transitions as reason for referral)?   No    Call Summary    \"What questions or concerns do you have about your recent visit and your follow-up care?\"     They would like to go to nutrition consultation.    \"If you have questions or things don't continue to improve, we encourage you contact us through the main clinic number (give number).  Even if the clinic is not open, triage nurses are available 24/7 to help you.     We would like you to know that our clinic has extended hours (provide information).  We also have urgent care (provide details on closest location and hours/contact info)\"      \"Thank you for your time and take care!\"      Marysol Acosta RN on 12/16/2019 at " 11:25 AM

## 2019-12-16 NOTE — TELEPHONE ENCOUNTER
Patient called to schedule an appointment for a hospital follow-up or appeared on a report showing that they were recently discharged from the hospital.    Patient was admitted to :  [facility]  Discharged date: 12/14/19  Reason for hospital admission:  St Elevation Myocardial Infarction Involving Right Coronary Artery (H)  Does patient have future appointment scheduled with provider? Yes Dr Rodriguez  Date of future appointment:  12/16/19      This information will be used to help the care team plan for the patients upcoming visit.  The triage RN may determine that a follow up call is necessary and reach out to the patient via the phone number listed in the chart.     Please route this message on routine priority to the Triage RN pool.

## 2019-12-16 NOTE — LETTER
"12/16/2019    Physician No Ref-Primary  No address on file    RE: Richy Rogers       Dear Colleague,    I had the pleasure of seeing Richy Rogers in the [facility].    HISTORY OF PRESENT ILLNESS:  ASymptomatic. Questions about other blockages and prevention of problems in future.    Orders this Visit:  No orders of the defined types were placed in this encounter.    No orders of the defined types were placed in this encounter.    There are no discontinued medications.    No diagnosis found.    CURRENT MEDICATIONS:  Current Outpatient Medications   Medication Sig Dispense Refill     aspirin (ASA) 81 MG EC tablet Take 1 tablet (81 mg) by mouth daily 90 tablet 0     lisinopril (PRINIVIL/ZESTRIL) 2.5 MG tablet Take 1 tablet (2.5 mg) by mouth daily 30 tablet 0     metoprolol tartrate (LOPRESSOR) 25 MG tablet Take 1 tablet (25 mg) by mouth 2 times daily 60 tablet 0     omeprazole (PRILOSEC) 20 MG DR capsule Take 20 mg by mouth every morning (before breakfast)       rosuvastatin (CRESTOR) 40 MG tablet Take 1 tablet (40 mg) by mouth daily 30 tablet 0     ticagrelor (BRILINTA) 90 MG tablet Take 1 tablet (90 mg) by mouth every 12 hours 60 tablet 0     nitroGLYcerin (NITROSTAT) 0.4 MG sublingual tablet For chest pain place 1 tablet under the tongue every 5 minutes for 3 doses. If symptoms persist 5 minutes after 1st dose call 911. (Patient not taking: Reported on 12/16/2019) 30 tablet 0       ALLERGIES   No Known Allergies    PAST MEDICAL, SURGICAL, FAMILY, SOCIAL HISTORY:  History was reviewed and updated as needed, see medical record.    Review of Systems:  A 12-point review of systems was completed, see medical record for detailed review of systems information.    Physical Exam:  Vitals: /74 (BP Location: Left arm, Patient Position: Fowlers, Cuff Size: Adult Regular)   Pulse 92   Ht 1.803 m (5' 11\")   Wt 85.6 kg (188 lb 11.2 oz)   SpO2 95%   BMI 26.32 kg/m       Constitutional:  "          Skin:           Head:           Eyes:           ENT:           Neck:           Chest:           Cardiac:                    Abdomen:           Vascular:                                        Extremities and Back:           Neurological:           ASSESSMENT: Stable CAD        RECOMMENDATIONS:  Rehab x 2 weeks if stable can return without limitations   Follow-up NP in one month      Recent Lab Results:  LIPID RESULTS:  Lab Results   Component Value Date    CHOL 212 (H) 12/12/2019    HDL 37 (L) 12/12/2019     (H) 12/12/2019    TRIG 159 (H) 12/12/2019       LIVER ENZYME RESULTS:  Lab Results   Component Value Date    AST 16 04/01/2019    ALT 49 04/01/2019       CBC RESULTS:  Lab Results   Component Value Date    WBC 9.5 12/13/2019    RBC 4.76 12/13/2019    HGB 14.6 12/13/2019    HCT 44.0 12/13/2019    MCV 92 12/13/2019    MCH 30.7 12/13/2019    MCHC 33.2 12/13/2019    RDW 12.7 12/13/2019     12/13/2019       BMP RESULTS:  Lab Results   Component Value Date     12/13/2019    POTASSIUM 4.2 12/13/2019    CHLORIDE 108 12/13/2019    CO2 25 12/13/2019    ANIONGAP 4 12/13/2019    GLC 92 12/13/2019    BUN 16 12/13/2019    CR 0.97 12/13/2019    GFRESTIMATED >90 12/13/2019    GFRESTBLACK >90 12/13/2019    RADHA 8.8 12/13/2019        A1C RESULTS:  Lab Results   Component Value Date    A1C 5.9 (H) 12/12/2019       INR RESULTS:  Lab Results   Component Value Date    INR 1.08 12/12/2019       We greatly appreciate the opportunity to be involved in the care of your patient, Richy Rogers.    Sincerely,  Maged Rodriguez MD      CC  No referring provider defined for this encounter.                                                                       Thank you for allowing me to participate in the care of your patient.      Sincerely,     Maged Rodriguez MD     [facility]    cc:   No referring provider defined for this encounter.

## 2019-12-17 NOTE — PROGRESS NOTES
Service Date: 12/16/2019      HISTORY OF PRESENT ILLNESS:  Mr. Rogers is a 44-year-old man with a family history of premature coronary artery disease, cigarette smoking, dyslipidemia.  On 12/12/2019 the patient experienced prolonged substernal chest discomfort.  His ECG in the [place] ER showed ST segment elevation in inferior leads.  The patient was given aspirin, ticagrelor and heparin and transferred directly to [facility] via helicopter.      Upon arrival, the patient's EKG changes resolved and he was free of chest pain.  He underwent urgent diagnostic coronary angiography by Dr. Shields.  The left main had no significant narrowing.  The LAD had a very distal apical 90% narrowing.  The small circumflex had a moderate mid-vessel 50% narrowing with JASEN 3 flow.  The dominant right coronary had diffuse disease with a subtotal narrowing in the mid segment and severe narrowing in the distal vessel before the crux..  The patient underwent implantation of overlapping 3.5 x 38 and 3.0 x 38 mm length zotarolimus-eluting Antonio stents in the mid and distal RCA  with excellent angiographic results.  Echocardiography showed an ejection fraction of 50%-55% with a region of basal inferior wall hypokinesis and no significant mitral insufficiency.  The patient was then discharged on aspirin, ticagrelor, rosuvastatin, metoprolol and lisinopril.  He is about to enter cardiac rehabilitation.  He has remained free of any symptoms since his discharge, but he and his wife had many questions about the long-term management of coronary disease.      PAST MEDICAL HISTORY:   1.  Cigarette smoking.   2.  Dyslipidemia.   3.  Coronary artery disease.   a.  Presentation with unstable angina/acute coronary syndrome with ST elevation -- status post implantation of 2 long drug-eluting stents in the distal and mid right coronary.  No significant narrowing in left main.  Focal distal narrowing in apical LAD.  Moderate mid  vessel  narrowing in small, nondominant  circumflex.   4.  Dyslipidemia -- on statin therapy.   5.  History of cigarette smoking -- has now discontinued.      PHYSICAL EXAMINATION:   GENERAL:  Exam today demonstrates a very pleasant, cooperative and intelligent 44-year-old man accompanied by his wife.   VITAL SIGNS:  His blood pressure is 110/74, his heart rate is 92.  His height is 1.8 meters, his weight is 85.6 kg.  His BMI is 26.   LUNGS:  Clear to percussion and auscultation.   CARDIOVASCULAR:  Exam shows a normal S1 with a normal S2.  There is no S3.  There is no murmur, rub or click.  His intervention site in the left wrist has a good pulse with no hematoma and no bruit.      ASSESSMENT:  Mr. Rogers sustained a small inferior wall myocardial infarction promptly treated with implantation of 2 drug-eluting stents in the RCA with excellent angiographic results.  He is on guideline-directed medical therapy, has stopped smoking and is free of angina, ready to resume his usual activities. Global LV systolic performance is within normal limits. He has a moderate mid CX lesion and a very distal lesion in the apical LAD. In the absence of symptoms, I believe there is no indication for further revascularization which is unlikely to affect his risk for recurrent MI or cardiac death. Should he experience angina, we can reassess.      .. The patient and his wife are frustrated by the fact that he had a negative nuclear stress test only 8 months before this event.  I explained to them  that stress testing has limited sensitivity and specificity and that many MIs occur as a result of a non-flow limiting stenosis.       In view of the small size of his MI,  would recommend at least 1-1/2 weeks of supervised cardiac rehabilitation prior to resuming work.  If the patient does well , I believe he can return back to work without limitations.  We will plan for a followup visit with a nurse in about 1 month to make certain he is  doing well.      RECOMMENDATIONS:   1.  Continued abstention from cigarette smoking.   2.  I would not advise further mechanical revascularization in the absence of symptoms.   3.  Continue present excellent guideline-directed medical therapy.   4.  Initiate cardiac rehab.  He should be able to return back to work without restriction in about 1-1/2 weeks, barring any problems with rehab.   5.  Mediterranean-style diet.   6.  Regular exercise program.   7.  Follow up with nurse in about 1 month, then annually with cardiologist.     We have appreciated the opportunity to be involved in the care of your patient, Mr. Scarlet Rogers.      MD KIMO Esparza MD             D: 2019   T: 2019   MT: CLAUDIA      Name:     MICHELE ROGERSN   MRN:      [id number]        Account:      [id number]   :      1975           Service Date: 2019      Document: [id number]

## 2019-12-18 ENCOUNTER — HOSPITAL ENCOUNTER (OUTPATIENT)
Dept: CARDIAC REHAB | Facility: CLINIC | Age: 44
End: 2019-12-18
Attending: INTERNAL MEDICINE
Payer: COMMERCIAL

## 2019-12-18 DIAGNOSIS — I21.11 ST ELEVATION MYOCARDIAL INFARCTION INVOLVING RIGHT CORONARY ARTERY (H): ICD-10-CM

## 2019-12-18 PROCEDURE — 40000116 ZZH STATISTIC OP CR VISIT

## 2019-12-18 PROCEDURE — 40000575 ZZH STATISTIC OP CARDIAC VISIT #2

## 2019-12-18 PROCEDURE — 93798 PHYS/QHP OP CAR RHAB W/ECG: CPT

## 2019-12-18 PROCEDURE — 93797 PHYS/QHP OP CAR RHAB WO ECG: CPT

## 2019-12-23 ENCOUNTER — HOSPITAL ENCOUNTER (OUTPATIENT)
Dept: CARDIAC REHAB | Facility: CLINIC | Age: 44
End: 2019-12-23
Attending: INTERNAL MEDICINE
Payer: COMMERCIAL

## 2019-12-23 PROCEDURE — 93798 PHYS/QHP OP CAR RHAB W/ECG: CPT

## 2019-12-23 PROCEDURE — 40000116 ZZH STATISTIC OP CR VISIT

## 2019-12-27 ENCOUNTER — HOSPITAL ENCOUNTER (OUTPATIENT)
Dept: CARDIAC REHAB | Facility: CLINIC | Age: 44
End: 2019-12-27
Attending: INTERNAL MEDICINE
Payer: COMMERCIAL

## 2019-12-27 PROCEDURE — 40000116 ZZH STATISTIC OP CR VISIT

## 2019-12-27 PROCEDURE — 93798 PHYS/QHP OP CAR RHAB W/ECG: CPT

## 2019-12-30 ENCOUNTER — HOSPITAL ENCOUNTER (OUTPATIENT)
Dept: CARDIAC REHAB | Facility: CLINIC | Age: 44
End: 2019-12-30
Attending: INTERNAL MEDICINE
Payer: COMMERCIAL

## 2019-12-30 PROCEDURE — 93798 PHYS/QHP OP CAR RHAB W/ECG: CPT | Performed by: REHABILITATION PRACTITIONER

## 2019-12-30 PROCEDURE — 40000116 ZZH STATISTIC OP CR VISIT: Performed by: REHABILITATION PRACTITIONER

## 2020-01-06 ENCOUNTER — HOSPITAL ENCOUNTER (OUTPATIENT)
Dept: CARDIAC REHAB | Facility: CLINIC | Age: 45
End: 2020-01-06
Attending: INTERNAL MEDICINE
Payer: COMMERCIAL

## 2020-01-06 PROCEDURE — 40000116 ZZH STATISTIC OP CR VISIT: Performed by: REHABILITATION PRACTITIONER

## 2020-01-06 PROCEDURE — 93798 PHYS/QHP OP CAR RHAB W/ECG: CPT | Performed by: REHABILITATION PRACTITIONER

## 2020-01-08 ENCOUNTER — HOSPITAL ENCOUNTER (OUTPATIENT)
Dept: CARDIAC REHAB | Facility: CLINIC | Age: 45
End: 2020-01-08
Attending: INTERNAL MEDICINE
Payer: COMMERCIAL

## 2020-01-08 DIAGNOSIS — I21.02 ACUTE ST ELEVATION MYOCARDIAL INFARCTION (STEMI) INVOLVING LEFT ANTERIOR DESCENDING (LAD) CORONARY ARTERY (H): ICD-10-CM

## 2020-01-08 PROCEDURE — 40000116 ZZH STATISTIC OP CR VISIT

## 2020-01-08 PROCEDURE — 93798 PHYS/QHP OP CAR RHAB W/ECG: CPT

## 2020-01-08 RX ORDER — LISINOPRIL 2.5 MG/1
2.5 TABLET ORAL DAILY
Qty: 30 TABLET | Refills: 11 | Status: SHIPPED | OUTPATIENT
Start: 2020-01-08

## 2020-01-08 RX ORDER — ROSUVASTATIN CALCIUM 40 MG/1
40 TABLET, COATED ORAL DAILY
Qty: 30 TABLET | Refills: 11 | Status: SHIPPED | OUTPATIENT
Start: 2020-01-08

## 2020-01-08 RX ORDER — METOPROLOL TARTRATE 25 MG/1
25 TABLET, FILM COATED ORAL 2 TIMES DAILY
Qty: 60 TABLET | Refills: 11 | Status: SHIPPED | OUTPATIENT
Start: 2020-01-08

## 2020-01-09 ENCOUNTER — TELEPHONE (OUTPATIENT)
Dept: CARDIOLOGY | Facility: CLINIC | Age: 45
End: 2020-01-09

## 2020-01-10 NOTE — TELEPHONE ENCOUNTER
PA Initiation    Medication: Brilinta 90mg -   Insurance Company: [employer] ([facility]) - Phone [phone] Fax [phone]  Pharmacy Filling the Rx: [facility] [place] - [address]  Filling Pharmacy Phone: [phone]  Filling Pharmacy Fax: [phone]  Start Date: 1/10/2020

## 2020-01-13 ENCOUNTER — HOSPITAL ENCOUNTER (OUTPATIENT)
Dept: CARDIAC REHAB | Facility: CLINIC | Age: 45
End: 2020-01-13
Attending: INTERNAL MEDICINE
Payer: COMMERCIAL

## 2020-01-13 PROCEDURE — 93798 PHYS/QHP OP CAR RHAB W/ECG: CPT | Performed by: REHABILITATION PRACTITIONER

## 2020-01-13 PROCEDURE — 40000116 ZZH STATISTIC OP CR VISIT: Performed by: REHABILITATION PRACTITIONER

## 2020-01-15 ENCOUNTER — HOSPITAL ENCOUNTER (OUTPATIENT)
Dept: CARDIAC REHAB | Facility: CLINIC | Age: 45
End: 2020-01-15
Attending: INTERNAL MEDICINE
Payer: COMMERCIAL

## 2020-01-15 PROCEDURE — 93798 PHYS/QHP OP CAR RHAB W/ECG: CPT

## 2020-01-15 PROCEDURE — 40000116 ZZH STATISTIC OP CR VISIT

## 2020-01-15 NOTE — TELEPHONE ENCOUNTER
Prior Authorization Not Needed per Insurance    Medication: Brilinta 90mg - NOT NEEDED  Insurance Company: [employer] ([facility]) - Phone [phone] Fax [phone]  Expected CoPay:      Pharmacy Filling the Rx: [facility] - [place], [place] - [address]   Pharmacy Notified: Yes  Patient Notified: Comment:  **Instructed pharmacy to notify patient when script is ready to /ship.**

## 2020-01-21 ENCOUNTER — OFFICE VISIT (OUTPATIENT)
Dept: CARDIOLOGY | Facility: CLINIC | Age: 45
End: 2020-01-21
Attending: INTERNAL MEDICINE
Payer: COMMERCIAL

## 2020-01-21 VITALS
WEIGHT: 189.5 LBS | HEIGHT: 71 IN | BODY MASS INDEX: 26.53 KG/M2 | SYSTOLIC BLOOD PRESSURE: 110 MMHG | DIASTOLIC BLOOD PRESSURE: 78 MMHG | HEART RATE: 82 BPM | OXYGEN SATURATION: 99 %

## 2020-01-21 DIAGNOSIS — I25.10 CORONARY ARTERY DISEASE INVOLVING NATIVE HEART, ANGINA PRESENCE UNSPECIFIED, UNSPECIFIED VESSEL OR LESION TYPE: ICD-10-CM

## 2020-01-21 DIAGNOSIS — I21.02 ACUTE ST ELEVATION MYOCARDIAL INFARCTION (STEMI) INVOLVING LEFT ANTERIOR DESCENDING (LAD) CORONARY ARTERY (H): ICD-10-CM

## 2020-01-21 PROCEDURE — 99214 OFFICE O/P EST MOD 30 MIN: CPT | Performed by: NURSE PRACTITIONER

## 2020-01-21 ASSESSMENT — MIFFLIN-ST. JEOR: SCORE: 1766.7

## 2020-01-21 NOTE — PATIENT INSTRUCTIONS
TODAY'S RECOMMENDATIONS    1. Fasting lipids in 2-4 weeks  2. Follow up in 1 year with Dr. Rodriguez      If you have questions or concerns please call clinic at [phone].    Please call [phone] for scheduling.      It was a pleasure seeing you today!

## 2020-01-21 NOTE — PROGRESS NOTES
Cardiology Clinic Progress Note  Richy Rogers MRN# [id number]   YOB: 1975 Age: 45 year old     Primary cardiologist: Dr. Rodriguez    Reason for visit: 1 month follow up    History of presenting illness:    Richy Rogers, a pleasant 45 year old patient who has a family history for premature coronary artery disease, personal history of cigarette smoking and dyslipidemia.  He was a seen in evaluation by Dr. Rodriguez on 12/16/2019 after he had a prolonged episode of substernal chest discomfort 4 days prior.  He was evaluated in the emergency department at [facility] and his initial EKG showed ST segment elevation in the inferior leads.  He was subsequently transferred to [facility] via helicopter.     Upon arrival, the patient's EKG changes resolved and he was chest pain-free.  He underwent a diagnostic coronary angiogram by Dr. Shields.  He was found to have a proximal RCA lesion that was 75% stenosed, 70 to 95% mid RCA lesion and two 40% distal RCA lesions.  There was successful angioplasty and stenting of multiple stenosis in the proximal, mid and distal RCA 2 drug-eluting stents. There was residual disease of the first RPL 25% lesion, 35% mid LAD lesion, 90% apical LAD lesion, 90% second diagonal lesion and a 60% mid circumflex lesion.  LVEDP was 11 with inferior wall hypokinesia and LVEF of 40 to 45%.    An echocardiogram showed LVEF of 50 to 55% with hypokinesia of the basal to mid inferior, mid inferior septal and mid inferior lateral, apical lateral and mid anterior lateral walls.  There were no significant valvular abnormalities.    Today in clinic he presents with his wife.  He has been discharged from cardiac rehab and plans to exercise at [employer] although, he has not started this yet.   I congratulated him on maintaining his smoke-free status.  He does occasionally note some left anterior pain that he describes as dull that is relieved with activity and can occur  with  "both activity and at rest.  At this time he is not utilized nitroglycerin to see if his symptoms are relieved, but due to the atypical nature of the symptoms and that they are not similar to his anginal equivalent it seems unlikely that they are ischemic related.  He denies any symptoms compliant with his dual antiplatelet therapies and other medications.         Assessment and Plan:     ASSESSMENT:    1. Acute inferior ST elevation MI    Status post drug-eluting stents to the mid and distal RCA    Residual focal distal narrowing of the apical LAD    DAPT with ASA and Brilinta    LVEF 50-55% by echocardiogram and 40-45% by LV gram    Metoprolol 25 mg BID and lisinopril 2.5 mg daily    2. Tobacco use    Quit post hospital discharge    3. Hyperlipidemia         Rosuvastatin 40 mg daily    PLAN:     1. Fasting lipids in 2 to 4 weeks  2. Follow-up with Dr. Rodriguez in 1 year or sooner if needed          Review of Systems:     Review of Systems:  Skin:  Negative     Eyes:  Negative    ENT:  Negative    Respiratory:  Positive for dyspnea on exertion  Cardiovascular:  Negative for;palpitations;edema;lightheadedness;dizziness Positive for;chest pain  Gastroenterology: Negative    Genitourinary:  Negative    Musculoskeletal:  Negative    Neurologic:  Negative    Psychiatric:  Positive for sleep disturbances  Heme/Lymph/Imm:  Negative    Endocrine:  Negative              Physical Exam:     Vitals: /78 (BP Location: Right arm, Patient Position: Fowlers, Cuff Size: Adult Regular)   Pulse 82   Ht 1.803 m (5' 11\")   Wt 86 kg (189 lb 8 oz)   SpO2 99%   BMI 26.43 kg/m    Constitutional:  cooperative, alert and oriented, well developed, well nourished, in no acute distress        Skin:  warm and dry to the touch, no apparent skin lesions or masses noted        Head:  normocephalic, no masses or lesions        Eyes:  pupils equal and round, conjunctivae and lids unremarkable, sclera white, no xanthalasma, EOMS " intact, no nystagmus        ENT:  no pallor or cyanosis, dentition good        Neck:  no stiffness        Chest:  normal breath sounds, clear to auscultation, normal A-P diameter, normal symmetry, normal respiratory excursion, no use of accessory muscles        Cardiac: regular rhythm, normal S1/S2, no S3 or S4, apical impulse not displaced, no murmurs, gallops or rubs                  Abdomen:  abdomen soft, non-tender, BS normoactive, no mass, no HSM, no bruits        Vascular: pulses full and equal, no bruits auscultated                                      Extremities and Back:  no deformities, clubbing, cyanosis, erythema observed        Neurological:  no gross motor deficits               Medications:     Current Outpatient Medications   Medication Sig Dispense Refill     aspirin (ASA) 81 MG EC tablet Take 1 tablet (81 mg) by mouth daily 90 tablet 0     lisinopril (PRINIVIL/ZESTRIL) 2.5 MG tablet Take 1 tablet (2.5 mg) by mouth daily 30 tablet 11     metoprolol tartrate (LOPRESSOR) 25 MG tablet Take 1 tablet (25 mg) by mouth 2 times daily 60 tablet 11     omeprazole (PRILOSEC) 20 MG DR capsule Take 20 mg by mouth every morning (before breakfast)       rosuvastatin (CRESTOR) 40 MG tablet Take 1 tablet (40 mg) by mouth daily 30 tablet 11     ticagrelor (BRILINTA) 90 MG tablet Take 1 tablet (90 mg) by mouth every 12 hours 60 tablet 11     nitroGLYcerin (NITROSTAT) 0.4 MG sublingual tablet For chest pain place 1 tablet under the tongue every 5 minutes for 3 doses. If symptoms persist 5 minutes after 1st dose call 911. (Patient not taking: Reported on 12/16/2019) 30 tablet 0       Family History   Problem Relation Age of Onset     Hypertension Mother      Heart Disease Father         First MI in his 40s     Myocardial Infarction Father      Cancer Father         bladder and colon     Hypertension Father      Thyroid Disease Sister        Social History     Socioeconomic History     Marital status:       Spouse name: Not on file     Number of children: Not on file     Years of education: Not on file     Highest education level: Not on file   Occupational History     Not on file   Social Needs     Financial resource strain: Not on file     Food insecurity:     Worry: Not on file     Inability: Not on file     Transportation needs:     Medical: Not on file     Non-medical: Not on file   Tobacco Use     Smoking status: Current Every Day Smoker     Packs/day: 0.25     Types: Cigarettes     Smokeless tobacco: Current User   Substance and Sexual Activity     Alcohol use: Yes     Comment: weekends     Drug use: No     Sexual activity: Yes     Partners: Female   Lifestyle     Physical activity:     Days per week: Not on file     Minutes per session: Not on file     Stress: Not on file   Relationships     Social connections:     Talks on phone: Not on file     Gets together: Not on file     Attends Jainism service: Not on file     Active member of club or organization: Not on file     Attends meetings of clubs or organizations: Not on file     Relationship status: Not on file     Intimate partner violence:     Fear of current or ex partner: Not on file     Emotionally abused: Not on file     Physically abused: Not on file     Forced sexual activity: Not on file   Other Topics Concern     Parent/sibling w/ CABG, MI or angioplasty before 65F 55M? Not Asked   Social History Narrative     Not on file            Past Medical History:   No past medical history on file.           Past Surgical History:     Past Surgical History:   Procedure Laterality Date     CV HEART CATHETERIZATION WITH POSSIBLE INTERVENTION N/A 12/12/2019    Procedure: Heart Catheterization with Possible Intervention;  Surgeon: Kevin Shields MD;  Location:  HEART CARDIAC CATH LAB     CV LEFT VENTRICULOGRAM N/A 12/12/2019    Procedure: Left Ventriculogram;  Surgeon: Kevin Shields MD;  Location:  HEART CARDIAC CATH LAB     CV PCI STENT DRUG  ELUTING N/A 12/12/2019    Procedure: PCI Stent Drug Eluting;  Surgeon: Kevin Shields MD;  Location:  HEART CARDIAC CATH LAB     ESOPHAGOSCOPY, GASTROSCOPY, DUODENOSCOPY (EGD), COMBINED N/A 4/18/2019    Procedure: ESOPHAGOSCOPY, GASTROSCOPY, DUODENOSCOPY (EGD);  Surgeon: Steve Saleh MD;  Location:  GI              Allergies:   Patient has no known allergies.       Data:   All laboratory data reviewed:    Recent Labs   Lab Test 12/12/19  1550 04/01/19  1554   * 179*   HDL 37* 44   NHDL 175* 213*   CHOL 212* 257*   TRIG 159* 169*   TSH  --  2.31       Lab Results   Component Value Date    WBC 9.5 12/13/2019    RBC 4.76 12/13/2019    HGB 14.6 12/13/2019    HCT 44.0 12/13/2019    MCV 92 12/13/2019    MCH 30.7 12/13/2019    MCHC 33.2 12/13/2019    RDW 12.7 12/13/2019     12/13/2019       Lab Results   Component Value Date     12/13/2019    POTASSIUM 4.2 12/13/2019    CHLORIDE 108 12/13/2019    CO2 25 12/13/2019    ANIONGAP 4 12/13/2019    GLC 92 12/13/2019    BUN 16 12/13/2019    CR 0.97 12/13/2019    GFRESTIMATED >90 12/13/2019    GFRESTBLACK >90 12/13/2019    RADHA 8.8 12/13/2019      Lab Results   Component Value Date    AST 16 04/01/2019    ALT 49 04/01/2019       Lab Results   Component Value Date    A1C 5.9 (H) 12/12/2019       Lab Results   Component Value Date    INR 1.08 12/12/2019         KALYN KC [facility]  Pager: [phone]  RN phone: [phone]

## 2020-01-21 NOTE — LETTER
1/21/2020    Physician No Ref-Primary  No address on file    RE: Richy Rogers       Dear Colleague,    I had the pleasure of seeing Richy Rogers in the [facility].    Cardiology Clinic Progress Note  Richy Rogers MRN# [id number]   YOB: 1975 Age: 45 year old     Primary cardiologist: Dr. Rodriguez    Reason for visit: 1 month follow up    History of presenting illness:    Richy Rogers, a pleasant 45 year old patient who has a family history for premature coronary artery disease, personal history of cigarette smoking and dyslipidemia.  He was a seen in evaluation by Dr. Rodriguez on 12/16/2019 after he had a prolonged episode of substernal chest discomfort 4 days prior.  He was evaluated in the emergency department at [facility] and his initial EKG showed ST segment elevation in the inferior leads.  He was subsequently transferred to [facility] via helicopter.     Upon arrival, the patient's EKG changes resolved and he was chest pain-free.  He underwent a diagnostic coronary angiogram by Dr. Shields.  He was found to have a proximal RCA lesion that was 75% stenosed, 70 to 95% mid RCA lesion and two 40% distal RCA lesions.  There was successful angioplasty and stenting of multiple stenosis in the proximal, mid and distal RCA 2 drug-eluting stents. There was residual disease of the first RPL 25% lesion, 35% mid LAD lesion, 90% apical LAD lesion, 90% second diagonal lesion and a 60% mid circumflex lesion.  LVEDP was 11 with inferior wall hypokinesia and LVEF of 40 to 45%.    An echocardiogram showed LVEF of 50 to 55% with hypokinesia of the basal to mid inferior, mid inferior septal and mid inferior lateral, apical lateral and mid anterior lateral walls.  There were no significant valvular abnormalities.    Today in clinic he presents with his wife.  He has been discharged from cardiac rehab and plans to exercise at [facility] [employer] although, he has not  "started this yet.   I congratulated him on maintaining his smoke-free status.  He does occasionally note some left anterior pain that he describes as dull that is relieved with activity and can occur  with both activity and at rest.  At this time he is not utilized nitroglycerin to see if his symptoms are relieved, but due to the atypical nature of the symptoms and that they are not similar to his anginal equivalent it seems unlikely that they are ischemic related.  He denies any symptoms compliant with his dual antiplatelet therapies and other medications.         Assessment and Plan:     ASSESSMENT:    1. Acute inferior ST elevation MI    Status post drug-eluting stents to the mid and distal RCA    Residual focal distal narrowing of the apical LAD    DAPT with ASA and Brilinta    LVEF 50-55% by echocardiogram and 40-45% by LV gram    Metoprolol 25 mg BID and lisinopril 2.5 mg daily    2. Tobacco use    Quit post hospital discharge    3. Hyperlipidemia         Rosuvastatin 40 mg daily    PLAN:     1. Fasting lipids in 2 to 4 weeks  2. Follow-up with Dr. Rodriguez in 1 year or sooner if needed          Review of Systems:     Review of Systems:  Skin:  Negative     Eyes:  Negative    ENT:  Negative    Respiratory:  Positive for dyspnea on exertion  Cardiovascular:  Negative for;palpitations;edema;lightheadedness;dizziness Positive for;chest pain  Gastroenterology: Negative    Genitourinary:  Negative    Musculoskeletal:  Negative    Neurologic:  Negative    Psychiatric:  Positive for sleep disturbances  Heme/Lymph/Imm:  Negative    Endocrine:  Negative              Physical Exam:     Vitals: /78 (BP Location: Right arm, Patient Position: Fowlers, Cuff Size: Adult Regular)   Pulse 82   Ht 1.803 m (5' 11\")   Wt 86 kg (189 lb 8 oz)   SpO2 99%   BMI 26.43 kg/m     Constitutional:  cooperative, alert and oriented, well developed, well nourished, in no acute distress        Skin:  warm and dry to the touch, " no apparent skin lesions or masses noted        Head:  normocephalic, no masses or lesions        Eyes:  pupils equal and round, conjunctivae and lids unremarkable, sclera white, no xanthalasma, EOMS intact, no nystagmus        ENT:  no pallor or cyanosis, dentition good        Neck:  no stiffness        Chest:  normal breath sounds, clear to auscultation, normal A-P diameter, normal symmetry, normal respiratory excursion, no use of accessory muscles        Cardiac: regular rhythm, normal S1/S2, no S3 or S4, apical impulse not displaced, no murmurs, gallops or rubs                  Abdomen:  abdomen soft, non-tender, BS normoactive, no mass, no HSM, no bruits        Vascular: pulses full and equal, no bruits auscultated                                      Extremities and Back:  no deformities, clubbing, cyanosis, erythema observed        Neurological:  no gross motor deficits               Medications:     Current Outpatient Medications   Medication Sig Dispense Refill     aspirin (ASA) 81 MG EC tablet Take 1 tablet (81 mg) by mouth daily 90 tablet 0     lisinopril (PRINIVIL/ZESTRIL) 2.5 MG tablet Take 1 tablet (2.5 mg) by mouth daily 30 tablet 11     metoprolol tartrate (LOPRESSOR) 25 MG tablet Take 1 tablet (25 mg) by mouth 2 times daily 60 tablet 11     omeprazole (PRILOSEC) 20 MG DR capsule Take 20 mg by mouth every morning (before breakfast)       rosuvastatin (CRESTOR) 40 MG tablet Take 1 tablet (40 mg) by mouth daily 30 tablet 11     ticagrelor (BRILINTA) 90 MG tablet Take 1 tablet (90 mg) by mouth every 12 hours 60 tablet 11     nitroGLYcerin (NITROSTAT) 0.4 MG sublingual tablet For chest pain place 1 tablet under the tongue every 5 minutes for 3 doses. If symptoms persist 5 minutes after 1st dose call 911. (Patient not taking: Reported on 12/16/2019) 30 tablet 0       Family History   Problem Relation Age of Onset     Hypertension Mother      Heart Disease Father         First MI in his 40s     Myocardial  Infarction Father      Cancer Father         bladder and colon     Hypertension Father      Thyroid Disease Sister        Social History     Socioeconomic History     Marital status:      Spouse name: Not on file     Number of children: Not on file     Years of education: Not on file     Highest education level: Not on file   Occupational History     Not on file   Social Needs     Financial resource strain: Not on file     Food insecurity:     Worry: Not on file     Inability: Not on file     Transportation needs:     Medical: Not on file     Non-medical: Not on file   Tobacco Use     Smoking status: Current Every Day Smoker     Packs/day: 0.25     Types: Cigarettes     Smokeless tobacco: Current User   Substance and Sexual Activity     Alcohol use: Yes     Comment: weekends     Drug use: No     Sexual activity: Yes     Partners: Female   Lifestyle     Physical activity:     Days per week: Not on file     Minutes per session: Not on file     Stress: Not on file   Relationships     Social connections:     Talks on phone: Not on file     Gets together: Not on file     Attends Episcopal service: Not on file     Active member of club or organization: Not on file     Attends meetings of clubs or organizations: Not on file     Relationship status: Not on file     Intimate partner violence:     Fear of current or ex partner: Not on file     Emotionally abused: Not on file     Physically abused: Not on file     Forced sexual activity: Not on file   Other Topics Concern     Parent/sibling w/ CABG, MI or angioplasty before 65F 55M? Not Asked   Social History Narrative     Not on file            Past Medical History:   No past medical history on file.           Past Surgical History:     Past Surgical History:   Procedure Laterality Date     CV HEART CATHETERIZATION WITH POSSIBLE INTERVENTION N/A 12/12/2019    Procedure: Heart Catheterization with Possible Intervention;  Surgeon: Kevin Shields MD;  Location:   HEART CARDIAC CATH LAB     CV LEFT VENTRICULOGRAM N/A 12/12/2019    Procedure: Left Ventriculogram;  Surgeon: Kevin Shields MD;  Location:  HEART CARDIAC CATH LAB     CV PCI STENT DRUG ELUTING N/A 12/12/2019    Procedure: PCI Stent Drug Eluting;  Surgeon: Kevin Shields MD;  Location:  HEART CARDIAC CATH LAB     ESOPHAGOSCOPY, GASTROSCOPY, DUODENOSCOPY (EGD), COMBINED N/A 4/18/2019    Procedure: ESOPHAGOSCOPY, GASTROSCOPY, DUODENOSCOPY (EGD);  Surgeon: Steve Saleh MD;  Location:  GI              Allergies:   Patient has no known allergies.       Data:   All laboratory data reviewed:    Recent Labs   Lab Test 12/12/19  1550 04/01/19  1554   * 179*   HDL 37* 44   NHDL 175* 213*   CHOL 212* 257*   TRIG 159* 169*   TSH  --  2.31       Lab Results   Component Value Date    WBC 9.5 12/13/2019    RBC 4.76 12/13/2019    HGB 14.6 12/13/2019    HCT 44.0 12/13/2019    MCV 92 12/13/2019    MCH 30.7 12/13/2019    MCHC 33.2 12/13/2019    RDW 12.7 12/13/2019     12/13/2019       Lab Results   Component Value Date     12/13/2019    POTASSIUM 4.2 12/13/2019    CHLORIDE 108 12/13/2019    CO2 25 12/13/2019    ANIONGAP 4 12/13/2019    GLC 92 12/13/2019    BUN 16 12/13/2019    CR 0.97 12/13/2019    GFRESTIMATED >90 12/13/2019    GFRESTBLACK >90 12/13/2019    RADHA 8.8 12/13/2019      Lab Results   Component Value Date    AST 16 04/01/2019    ALT 49 04/01/2019       Lab Results   Component Value Date    A1C 5.9 (H) 12/12/2019       Lab Results   Component Value Date    INR 1.08 12/12/2019         KALYN KC CNP  [facility]  Pager: [phone]  RN phone: [phone]    Thank you for allowing me to participate in the care of your patient.      Sincerely,     KALYN KC CNP     [facility]    cc:   Maged Rodriguez MD  [address]  [place] [id number]

## 2020-03-01 ENCOUNTER — HEALTH MAINTENANCE LETTER (OUTPATIENT)
Age: 45
End: 2020-03-01

## 2020-03-04 ENCOUNTER — CARE COORDINATION (OUTPATIENT)
Dept: CARDIOLOGY | Facility: CLINIC | Age: 45
End: 2020-03-04

## 2020-03-04 DIAGNOSIS — I25.10 CORONARY ARTERY DISEASE INVOLVING NATIVE HEART, ANGINA PRESENCE UNSPECIFIED, UNSPECIFIED VESSEL OR LESION TYPE: Primary | ICD-10-CM

## 2020-03-04 DIAGNOSIS — E78.2 MIXED HYPERLIPIDEMIA: ICD-10-CM

## 2020-03-04 DIAGNOSIS — I25.10 CORONARY ARTERY DISEASE INVOLVING NATIVE CORONARY ARTERY OF NATIVE HEART WITHOUT ANGINA PECTORIS: ICD-10-CM

## 2020-03-04 LAB
ALT SERPL W P-5'-P-CCNC: 54 U/L (ref 0–70)
ANION GAP SERPL CALCULATED.3IONS-SCNC: 4 MMOL/L (ref 3–14)
BUN SERPL-MCNC: 14 MG/DL (ref 7–30)
CALCIUM SERPL-MCNC: 8.8 MG/DL (ref 8.5–10.1)
CHLORIDE SERPL-SCNC: 107 MMOL/L (ref 94–109)
CHOLEST SERPL-MCNC: 198 MG/DL
CO2 SERPL-SCNC: 24 MMOL/L (ref 20–32)
CREAT SERPL-MCNC: 0.97 MG/DL (ref 0.66–1.25)
ERYTHROCYTE [DISTWIDTH] IN BLOOD BY AUTOMATED COUNT: 12.5 % (ref 10–15)
GFR SERPL CREATININE-BSD FRML MDRD: >90 ML/MIN/{1.73_M2}
GLUCOSE SERPL-MCNC: 89 MG/DL (ref 70–99)
HCT VFR BLD AUTO: 43 % (ref 40–53)
HDLC SERPL-MCNC: 44 MG/DL
HGB BLD-MCNC: 13.8 G/DL (ref 13.3–17.7)
LDLC SERPL CALC-MCNC: 123 MG/DL
MCH RBC QN AUTO: 31.2 PG (ref 26.5–33)
MCHC RBC AUTO-ENTMCNC: 34.2 G/DL (ref 31.5–36.5)
MCV RBC AUTO: 91 FL (ref 78–100)
NONHDLC SERPL-MCNC: 154 MG/DL
PLATELET # BLD AUTO: 212 10E9/L (ref 150–450)
POTASSIUM SERPL-SCNC: 4.1 MMOL/L (ref 3.4–5.3)
RBC # BLD AUTO: 4.66 10E12/L (ref 4.4–5.9)
SODIUM SERPL-SCNC: 135 MMOL/L (ref 133–144)
TRIGL SERPL-MCNC: 154 MG/DL
WBC # BLD AUTO: 9.1 10E9/L (ref 4–11)

## 2020-03-04 PROCEDURE — 84460 ALANINE AMINO (ALT) (SGPT): CPT | Performed by: NURSE PRACTITIONER

## 2020-03-04 PROCEDURE — 85027 COMPLETE CBC AUTOMATED: CPT | Performed by: PHYSICIAN ASSISTANT

## 2020-03-04 PROCEDURE — 80061 LIPID PANEL: CPT | Performed by: NURSE PRACTITIONER

## 2020-03-04 PROCEDURE — 36415 COLL VENOUS BLD VENIPUNCTURE: CPT | Performed by: PHYSICIAN ASSISTANT

## 2020-03-04 PROCEDURE — 80048 BASIC METABOLIC PNL TOTAL CA: CPT | Performed by: PHYSICIAN ASSISTANT

## 2020-03-05 DIAGNOSIS — E78.2 MIXED HYPERLIPIDEMIA: ICD-10-CM

## 2020-03-05 DIAGNOSIS — I25.10 CORONARY ARTERY DISEASE INVOLVING NATIVE HEART, ANGINA PRESENCE UNSPECIFIED, UNSPECIFIED VESSEL OR LESION TYPE: Primary | ICD-10-CM

## 2020-03-05 RX ORDER — EZETIMIBE 10 MG/1
10 TABLET ORAL DAILY
Qty: 30 TABLET | Refills: 11 | Status: SHIPPED | OUTPATIENT
Start: 2020-03-05 | End: 2021-03-08

## 2020-03-11 DIAGNOSIS — I21.02 ACUTE ST ELEVATION MYOCARDIAL INFARCTION (STEMI) INVOLVING LEFT ANTERIOR DESCENDING (LAD) CORONARY ARTERY (H): ICD-10-CM

## 2020-08-25 ENCOUNTER — TELEPHONE (OUTPATIENT)
Dept: CARDIOLOGY | Facility: CLINIC | Age: 45
End: 2020-08-25

## 2020-08-25 DIAGNOSIS — E78.2 MIXED HYPERLIPIDEMIA: ICD-10-CM

## 2020-08-25 DIAGNOSIS — I21.02 ACUTE ST ELEVATION MYOCARDIAL INFARCTION (STEMI) INVOLVING LEFT ANTERIOR DESCENDING (LAD) CORONARY ARTERY (H): ICD-10-CM

## 2020-08-25 DIAGNOSIS — I25.10 CORONARY ARTERY DISEASE INVOLVING NATIVE HEART, ANGINA PRESENCE UNSPECIFIED, UNSPECIFIED VESSEL OR LESION TYPE: Primary | ICD-10-CM

## 2020-08-25 RX ORDER — NITROGLYCERIN 0.4 MG/1
TABLET SUBLINGUAL
Qty: 25 TABLET | Refills: 1 | Status: SHIPPED | OUTPATIENT
Start: 2020-08-25

## 2020-08-25 NOTE — PROGRESS NOTES
Per OV on 1/21/20 w/ Debby Eid CNP, pt to see  in 1 yr. Placed orders for visit w/  1/2021.     Sent refill for Nitroglycerin per pt request. DARRIN Becker

## 2020-08-25 NOTE — TELEPHONE ENCOUNTER
Unable to reach patient. Left detailed message asking patient to call back to confirm he is still taking zetia 10mg daily and crestor 40mg daily and to set up fasting lab work that was due in April 2020.

## 2020-08-28 DIAGNOSIS — I25.10 CORONARY ARTERY DISEASE INVOLVING NATIVE HEART, ANGINA PRESENCE UNSPECIFIED, UNSPECIFIED VESSEL OR LESION TYPE: ICD-10-CM

## 2020-08-28 DIAGNOSIS — E78.2 MIXED HYPERLIPIDEMIA: ICD-10-CM

## 2020-08-28 LAB
ALT SERPL W P-5'-P-CCNC: 49 U/L (ref 0–70)
CHOLEST SERPL-MCNC: 122 MG/DL
HDLC SERPL-MCNC: 44 MG/DL
LDLC SERPL CALC-MCNC: 55 MG/DL
NONHDLC SERPL-MCNC: 78 MG/DL
TRIGL SERPL-MCNC: 114 MG/DL

## 2020-08-28 PROCEDURE — 84460 ALANINE AMINO (ALT) (SGPT): CPT | Performed by: NURSE PRACTITIONER

## 2020-08-28 PROCEDURE — 36415 COLL VENOUS BLD VENIPUNCTURE: CPT | Performed by: NURSE PRACTITIONER

## 2020-08-28 PROCEDURE — 80061 LIPID PANEL: CPT | Performed by: NURSE PRACTITIONER

## 2020-09-01 NOTE — TELEPHONE ENCOUNTER
Patient had labs drawn on 8/28. Confirmed with patient he is still taking zetia 10mg daily and crestor 40mg daily.     Component      Latest Ref Rng & Units 8/28/2020   Cholesterol      <200 mg/dL 122   Triglycerides      <150 mg/dL 114   HDL Cholesterol      >39 mg/dL 44   LDL Cholesterol Calculated      <100 mg/dL 55   Non HDL Cholesterol      <130 mg/dL 78   ALT      0 - 70 U/L 49     Will route to Debby Eid NP for review.

## 2020-11-02 ENCOUNTER — MYC MEDICAL ADVICE (OUTPATIENT)
Dept: FAMILY MEDICINE | Facility: CLINIC | Age: 45
End: 2020-11-02

## 2020-11-02 DIAGNOSIS — I25.119 CORONARY ARTERY DISEASE INVOLVING NATIVE HEART WITH ANGINA PECTORIS, UNSPECIFIED VESSEL OR LESION TYPE (H): Primary | ICD-10-CM

## 2020-11-04 NOTE — TELEPHONE ENCOUNTER
I spoke to wife who sent the message  She said he had likes the care that he gets there and it is closer to home.  Please sign order and route back to me.   I have to fax his info.

## 2020-11-25 ENCOUNTER — TRANSFERRED RECORDS (OUTPATIENT)
Dept: HEALTH INFORMATION MANAGEMENT | Facility: CLINIC | Age: 45
End: 2020-11-25

## 2020-12-14 ENCOUNTER — HEALTH MAINTENANCE LETTER (OUTPATIENT)
Age: 45
End: 2020-12-14

## 2021-01-07 ENCOUNTER — TELEPHONE (OUTPATIENT)
Dept: CARDIOLOGY | Facility: CLINIC | Age: 46
End: 2021-01-07

## 2021-01-07 NOTE — TELEPHONE ENCOUNTER
I called cell number listed for Richy and spoke to his wife- he was at work. I told her I received refill request for 4 medications. I opened his chart and saw he recently saw a Cardiologist in [place] I asked her if he had transferred his care to [place]. She said they did because they don't want to take another plane ride to a Dr. If he has another heart attack. They won't be coming back. I told her I would call the pharmacy but she said she would do that. I will forward this message to ' nurse team.

## 2021-03-08 DIAGNOSIS — I25.10 CORONARY ARTERY DISEASE INVOLVING NATIVE HEART, ANGINA PRESENCE UNSPECIFIED, UNSPECIFIED VESSEL OR LESION TYPE: ICD-10-CM

## 2021-03-08 DIAGNOSIS — E78.2 MIXED HYPERLIPIDEMIA: ICD-10-CM

## 2021-03-08 RX ORDER — EZETIMIBE 10 MG/1
10 TABLET ORAL DAILY
Qty: 30 TABLET | Refills: 11 | Status: SHIPPED | OUTPATIENT
Start: 2021-03-08 | End: 2022-03-16

## 2021-03-08 NOTE — TELEPHONE ENCOUNTER
Patient due for annual follow up with Dr. Rodriguez. Message sent to specialty schedulers to call patient for appointment.

## 2021-04-17 ENCOUNTER — HEALTH MAINTENANCE LETTER (OUTPATIENT)
Age: 46
End: 2021-04-17

## 2021-04-19 DIAGNOSIS — I21.02 ACUTE ST ELEVATION MYOCARDIAL INFARCTION (STEMI) INVOLVING LEFT ANTERIOR DESCENDING (LAD) CORONARY ARTERY (H): ICD-10-CM

## 2021-10-02 ENCOUNTER — HEALTH MAINTENANCE LETTER (OUTPATIENT)
Age: 46
End: 2021-10-02

## 2022-03-16 DIAGNOSIS — E78.2 MIXED HYPERLIPIDEMIA: ICD-10-CM

## 2022-03-16 RX ORDER — EZETIMIBE 10 MG/1
10 TABLET ORAL DAILY
Qty: 30 TABLET | Refills: 3 | Status: SHIPPED | OUTPATIENT
Start: 2022-03-16 | End: 2022-07-18

## 2022-03-16 NOTE — TELEPHONE ENCOUNTER
Patient over due for follow up with Dr. Rodriguez. Message sent to scheduling to call patient to help set up visit so we can continue to fill hear medications.

## 2022-05-14 ENCOUNTER — HEALTH MAINTENANCE LETTER (OUTPATIENT)
Age: 47
End: 2022-05-14

## 2022-07-18 DIAGNOSIS — E78.2 MIXED HYPERLIPIDEMIA: ICD-10-CM

## 2022-07-18 RX ORDER — EZETIMIBE 10 MG/1
10 TABLET ORAL DAILY
Qty: 30 TABLET | Refills: 0 | Status: SHIPPED | OUTPATIENT
Start: 2022-07-18

## 2022-07-19 ENCOUNTER — TELEPHONE (OUTPATIENT)
Dept: CARDIOLOGY | Facility: CLINIC | Age: 47
End: 2022-07-19

## 2022-07-19 NOTE — TELEPHONE ENCOUNTER
----- Message from Roula Umana sent at 7/19/2022  3:05 PM CDT -----  Regarding: RE: pls call to schedule over due cards visit with kristen  Per pt he switched to [facility] heart . Thank You Roula    ----- Message -----  From: Raven Corado RN  Sent: 7/18/2022   8:18 AM CDT  To: [facility] [place] [place] Specialty Scheduling  Subject: pls call to schedule over due cards visit wi#

## 2022-08-04 ENCOUNTER — TELEPHONE (OUTPATIENT)
Dept: CARDIOLOGY | Facility: CLINIC | Age: 47
End: 2022-08-04

## 2022-08-04 NOTE — TELEPHONE ENCOUNTER
I returned a fax to [facility] in [place] requesting a refill of Aspirin. Patient told  at the [facility] he switched his Cardiology care to [facility]. Please contact the patient for his new Cardiologist or primary provider.

## 2022-09-03 ENCOUNTER — HEALTH MAINTENANCE LETTER (OUTPATIENT)
Age: 47
End: 2022-09-03

## 2023-04-22 NOTE — Clinical Note
Stent deployed in the proximal right coronary artery. Max pressure = 12 corina. Total duration = 30 seconds.  Slit Excision Additional Text (Leave Blank If You Do Not Want): A linear line was drawn on the skin overlying the lesion. An incision was made slowly until the lesion was visualized.  Once visualized, the lesion was removed with blunt dissection.

## 2023-06-02 ENCOUNTER — HEALTH MAINTENANCE LETTER (OUTPATIENT)
Age: 48
End: 2023-06-02

## 2025-03-26 NOTE — TELEPHONE ENCOUNTER
Med already dispensed to pt 3/22/25   Prior Authorization Retail Medication Request    Medication/Dose: brilinta 90mg  ICD code (if different than what is on RX):  Severe CAD , pt 44 years of age  Previously Tried and Failed:    Rationale:    He now presents with typical chest pain with nausea, vomiting, diaphoresis, ST elevations in the inferior leads.  He is status post PCI to the RCA which is 95% stenosed at the mid RCA level.  There was a proximal lesion of 75% stenosis and distal RCA 40% stenosis.  The patient is started on aspirin, Brilinta, metoprolol 25 mg b.i.d., Lisinopril 2.5 mg daily, and Crestor.   Cardiology will be following.  On further evaluation on left heart catheterization, there is other distal LAD lesion 90% stenosed.  There is a small second diagonal which is 90% stenosed, and a mid circumflex lesion 60% stenosed which does not appear to be flow-limiting.  He may need to staged PCI, follow-up with cardiology as scheduled. ( from discharge summary)    Insurance Name:  [facility]  Insurance ID:  [id number]      Pharmacy Information (if different than what is on RX)  Name: Rigoberto JUDGE  Phone:  [phone]

## (undated) DEVICE — CATH ANGIO INFINITI PIGTAIL 145 6 SH 6FRX110CM  534-652S

## (undated) DEVICE — KIT HAND CONTROL ANGIOTOUCH ACIST 65CM AT-P65

## (undated) DEVICE — CATH ANGIO JUDKINS R4 6FRX100CM INFINITI 534621T

## (undated) DEVICE — INFL DVC KIT W/10CC NITRO IN4530

## (undated) DEVICE — MANIFOLD KIT ANGIO AUTOMATED 014613

## (undated) DEVICE — WIRE GUIDE 0.035"X260CM SAFE-T-J EXCHANGE G00517

## (undated) DEVICE — CATH ANGIO JUDKINS JL4 6FRX100CM INFINITI 534620T

## (undated) DEVICE — CATH BALLOON EMERGE 3.5X20MM H7493918920350

## (undated) DEVICE — DEFIB PRO-PADZ LVP LQD GEL ADULT 8900-2105-01

## (undated) DEVICE — CATH LAUNCHER 6FR JR 4.0 LA6JR40

## (undated) DEVICE — GW VASC 190CM .014IN HI-TRQ 1009660J

## (undated) DEVICE — SLEEVE TR BAND RADIAL COMPRESSION DEVICE 24CM TRB24-REG

## (undated) DEVICE — TOTE ANGIO CORP PC15AT SAN32CC83O

## (undated) DEVICE — INTRO GLIDESHEATH SLENDER 6FR 10X45CM 60-1060

## (undated) DEVICE — GUIDEWIRE VASC 0.014INX190CM J TIP CGRXT190HJ

## (undated) DEVICE — CATH ANGIO JUDKINS JL3.5 6FRX100CM INFINITI 534618T

## (undated) RX ORDER — HEPARIN SODIUM 200 [USP'U]/100ML
INJECTION, SOLUTION INTRAVENOUS
Status: DISPENSED
Start: 2019-12-12

## (undated) RX ORDER — NITROGLYCERIN 5 MG/ML
VIAL (ML) INTRAVENOUS
Status: DISPENSED
Start: 2019-12-12

## (undated) RX ORDER — FENTANYL CITRATE 50 UG/ML
INJECTION, SOLUTION INTRAMUSCULAR; INTRAVENOUS
Status: DISPENSED
Start: 2019-12-12

## (undated) RX ORDER — VERAPAMIL HYDROCHLORIDE 2.5 MG/ML
INJECTION, SOLUTION INTRAVENOUS
Status: DISPENSED
Start: 2019-12-12

## (undated) RX ORDER — LIDOCAINE HYDROCHLORIDE 10 MG/ML
INJECTION, SOLUTION EPIDURAL; INFILTRATION; INTRACAUDAL; PERINEURAL
Status: DISPENSED
Start: 2019-12-12

## (undated) RX ORDER — HEPARIN SODIUM 1000 [USP'U]/ML
INJECTION, SOLUTION INTRAVENOUS; SUBCUTANEOUS
Status: DISPENSED
Start: 2019-12-12